# Patient Record
Sex: FEMALE | Race: WHITE | NOT HISPANIC OR LATINO | Employment: FULL TIME | URBAN - METROPOLITAN AREA
[De-identification: names, ages, dates, MRNs, and addresses within clinical notes are randomized per-mention and may not be internally consistent; named-entity substitution may affect disease eponyms.]

---

## 2017-02-09 ENCOUNTER — HOSPITAL ENCOUNTER (OUTPATIENT)
Dept: MAMMOGRAPHY | Facility: HOSPITAL | Age: 52
Discharge: HOME | End: 2017-02-09
Attending: OBSTETRICS & GYNECOLOGY

## 2017-02-09 DIAGNOSIS — Z12.31 OTHER SCREENING MAMMOGRAM: ICD-10-CM

## 2018-03-12 ENCOUNTER — HOSPITAL ENCOUNTER (OUTPATIENT)
Dept: MAMMOGRAPHY | Facility: CLINIC | Age: 53
Discharge: HOME | End: 2018-03-12
Attending: OBSTETRICS & GYNECOLOGY

## 2018-03-12 DIAGNOSIS — Z12.31 VISIT FOR SCREENING MAMMOGRAM: ICD-10-CM

## 2019-04-09 ENCOUNTER — HOSPITAL ENCOUNTER (OUTPATIENT)
Dept: MAMMOGRAPHY | Facility: CLINIC | Age: 54
Discharge: HOME | End: 2019-04-09
Attending: OBSTETRICS & GYNECOLOGY

## 2019-04-09 ENCOUNTER — COMMUNICATION - HEALTHEAST (OUTPATIENT)
Dept: TELEHEALTH | Facility: CLINIC | Age: 54
End: 2019-04-09

## 2019-04-09 DIAGNOSIS — Z12.31 VISIT FOR SCREENING MAMMOGRAM: ICD-10-CM

## 2020-06-10 ENCOUNTER — HOSPITAL ENCOUNTER (OUTPATIENT)
Dept: MAMMOGRAPHY | Facility: CLINIC | Age: 55
Discharge: HOME | End: 2020-06-10
Attending: OBSTETRICS & GYNECOLOGY

## 2020-06-10 ENCOUNTER — COMMUNICATION - HEALTHEAST (OUTPATIENT)
Dept: TELEHEALTH | Facility: CLINIC | Age: 55
End: 2020-06-10

## 2020-06-10 DIAGNOSIS — Z12.31 SCREENING MAMMOGRAM, ENCOUNTER FOR: ICD-10-CM

## 2021-05-28 ENCOUNTER — RECORDS - HEALTHEAST (OUTPATIENT)
Dept: ADMINISTRATIVE | Facility: CLINIC | Age: 56
End: 2021-05-28

## 2021-06-26 ENCOUNTER — HEALTH MAINTENANCE LETTER (OUTPATIENT)
Age: 56
End: 2021-06-26

## 2021-07-13 ENCOUNTER — RECORDS - HEALTHEAST (OUTPATIENT)
Dept: ADMINISTRATIVE | Facility: CLINIC | Age: 56
End: 2021-07-13

## 2021-07-21 ENCOUNTER — RECORDS - HEALTHEAST (OUTPATIENT)
Dept: ADMINISTRATIVE | Facility: CLINIC | Age: 56
End: 2021-07-21

## 2021-10-05 ENCOUNTER — PREP FOR PROCEDURE (OUTPATIENT)
Dept: OTHER | Facility: CLINIC | Age: 56
End: 2021-10-05

## 2021-10-05 ENCOUNTER — HOSPITAL ENCOUNTER (INPATIENT)
Facility: CLINIC | Age: 56
Setting detail: SURGERY ADMIT
End: 2021-10-05
Attending: ORTHOPAEDIC SURGERY | Admitting: ORTHOPAEDIC SURGERY
Payer: COMMERCIAL

## 2021-10-05 ENCOUNTER — HOSPITAL ENCOUNTER (INPATIENT)
Facility: CLINIC | Age: 56
LOS: 5 days | Discharge: HOME HEALTH | DRG: 522 | End: 2021-10-10
Attending: EMERGENCY MEDICINE | Admitting: INTERNAL MEDICINE
Payer: COMMERCIAL

## 2021-10-05 ENCOUNTER — APPOINTMENT (OUTPATIENT)
Dept: RADIOLOGY | Facility: CLINIC | Age: 56
DRG: 522 | End: 2021-10-05
Attending: EMERGENCY MEDICINE
Payer: COMMERCIAL

## 2021-10-05 DIAGNOSIS — S72.001A HIP FRACTURE, RIGHT, CLOSED, INITIAL ENCOUNTER (H): ICD-10-CM

## 2021-10-05 DIAGNOSIS — S72.001A: Primary | ICD-10-CM

## 2021-10-05 LAB
ANION GAP SERPL CALCULATED.3IONS-SCNC: 9 MMOL/L (ref 5–18)
BASOPHILS # BLD AUTO: 0 10E3/UL (ref 0–0.2)
BASOPHILS NFR BLD AUTO: 0 %
BUN SERPL-MCNC: 18 MG/DL (ref 8–22)
CALCIUM SERPL-MCNC: 8.8 MG/DL (ref 8.5–10.5)
CHLORIDE BLD-SCNC: 108 MMOL/L (ref 98–107)
CO2 SERPL-SCNC: 22 MMOL/L (ref 22–31)
CREAT SERPL-MCNC: 0.76 MG/DL (ref 0.6–1.1)
EOSINOPHIL # BLD AUTO: 0 10E3/UL (ref 0–0.7)
EOSINOPHIL NFR BLD AUTO: 0 %
ERYTHROCYTE [DISTWIDTH] IN BLOOD BY AUTOMATED COUNT: 11.7 % (ref 10–15)
GFR SERPL CREATININE-BSD FRML MDRD: 87 ML/MIN/1.73M2
GLUCOSE BLD-MCNC: 134 MG/DL (ref 70–125)
HCT VFR BLD AUTO: 35.5 % (ref 35–47)
HGB BLD-MCNC: 12 G/DL (ref 11.7–15.7)
IMM GRANULOCYTES # BLD: 0 10E3/UL
IMM GRANULOCYTES NFR BLD: 0 %
INR PPP: 1.04 (ref 0.85–1.15)
LYMPHOCYTES # BLD AUTO: 1.2 10E3/UL (ref 0.8–5.3)
LYMPHOCYTES NFR BLD AUTO: 10 %
MCH RBC QN AUTO: 30.2 PG (ref 26.5–33)
MCHC RBC AUTO-ENTMCNC: 33.8 G/DL (ref 31.5–36.5)
MCV RBC AUTO: 89 FL (ref 78–100)
MONOCYTES # BLD AUTO: 0.6 10E3/UL (ref 0–1.3)
MONOCYTES NFR BLD AUTO: 5 %
NEUTROPHILS # BLD AUTO: 10.7 10E3/UL (ref 1.6–8.3)
NEUTROPHILS NFR BLD AUTO: 85 %
NRBC # BLD AUTO: 0 10E3/UL
NRBC BLD AUTO-RTO: 0 /100
PLATELET # BLD AUTO: 165 10E3/UL (ref 150–450)
POTASSIUM BLD-SCNC: 3.9 MMOL/L (ref 3.5–5)
RBC # BLD AUTO: 3.98 10E6/UL (ref 3.8–5.2)
SODIUM SERPL-SCNC: 139 MMOL/L (ref 136–145)
WBC # BLD AUTO: 12.5 10E3/UL (ref 4–11)

## 2021-10-05 PROCEDURE — 96376 TX/PRO/DX INJ SAME DRUG ADON: CPT

## 2021-10-05 PROCEDURE — 80048 BASIC METABOLIC PNL TOTAL CA: CPT | Performed by: EMERGENCY MEDICINE

## 2021-10-05 PROCEDURE — 99223 1ST HOSP IP/OBS HIGH 75: CPT | Performed by: INTERNAL MEDICINE

## 2021-10-05 PROCEDURE — 96374 THER/PROPH/DIAG INJ IV PUSH: CPT | Mod: 59

## 2021-10-05 PROCEDURE — 27268 CLTX THIGH FX W/MNPJ: CPT

## 2021-10-05 PROCEDURE — C9803 HOPD COVID-19 SPEC COLLECT: HCPCS

## 2021-10-05 PROCEDURE — 999N000157 HC STATISTIC RCP TIME EA 10 MIN

## 2021-10-05 PROCEDURE — 99285 EMERGENCY DEPT VISIT HI MDM: CPT | Mod: 25

## 2021-10-05 PROCEDURE — 999N000065 XR HIP PORTABLE RIGHT 2-3 VIEWS

## 2021-10-05 PROCEDURE — 85041 AUTOMATED RBC COUNT: CPT | Performed by: EMERGENCY MEDICINE

## 2021-10-05 PROCEDURE — 71045 X-RAY EXAM CHEST 1 VIEW: CPT

## 2021-10-05 PROCEDURE — 93005 ELECTROCARDIOGRAM TRACING: CPT | Performed by: EMERGENCY MEDICINE

## 2021-10-05 PROCEDURE — 258N000003 HC RX IP 258 OP 636: Performed by: EMERGENCY MEDICINE

## 2021-10-05 PROCEDURE — 73502 X-RAY EXAM HIP UNI 2-3 VIEWS: CPT

## 2021-10-05 PROCEDURE — 250N000009 HC RX 250: Performed by: EMERGENCY MEDICINE

## 2021-10-05 PROCEDURE — 250N000011 HC RX IP 250 OP 636: Performed by: EMERGENCY MEDICINE

## 2021-10-05 PROCEDURE — 85610 PROTHROMBIN TIME: CPT | Performed by: EMERGENCY MEDICINE

## 2021-10-05 PROCEDURE — 96375 TX/PRO/DX INJ NEW DRUG ADDON: CPT

## 2021-10-05 PROCEDURE — 36415 COLL VENOUS BLD VENIPUNCTURE: CPT | Performed by: EMERGENCY MEDICINE

## 2021-10-05 PROCEDURE — 0SS9XZZ REPOSITION RIGHT HIP JOINT, EXTERNAL APPROACH: ICD-10-PCS | Performed by: EMERGENCY MEDICINE

## 2021-10-05 PROCEDURE — 96361 HYDRATE IV INFUSION ADD-ON: CPT

## 2021-10-05 PROCEDURE — U0005 INFEC AGEN DETEC AMPLI PROBE: HCPCS | Performed by: EMERGENCY MEDICINE

## 2021-10-05 PROCEDURE — 120N000001 HC R&B MED SURG/OB

## 2021-10-05 RX ORDER — FLUMAZENIL 0.1 MG/ML
0.2 INJECTION, SOLUTION INTRAVENOUS
Status: ACTIVE | OUTPATIENT
Start: 2021-10-05 | End: 2021-10-06

## 2021-10-05 RX ORDER — NALOXONE HYDROCHLORIDE 0.4 MG/ML
0.4 INJECTION, SOLUTION INTRAMUSCULAR; INTRAVENOUS; SUBCUTANEOUS
Status: DISCONTINUED | OUTPATIENT
Start: 2021-10-05 | End: 2021-10-10 | Stop reason: HOSPADM

## 2021-10-05 RX ORDER — NALOXONE HYDROCHLORIDE 0.4 MG/ML
0.2 INJECTION, SOLUTION INTRAMUSCULAR; INTRAVENOUS; SUBCUTANEOUS
Status: DISCONTINUED | OUTPATIENT
Start: 2021-10-05 | End: 2021-10-10 | Stop reason: HOSPADM

## 2021-10-05 RX ORDER — HYDROMORPHONE HYDROCHLORIDE 1 MG/ML
0.5 INJECTION, SOLUTION INTRAMUSCULAR; INTRAVENOUS; SUBCUTANEOUS ONCE
Status: COMPLETED | OUTPATIENT
Start: 2021-10-05 | End: 2021-10-05

## 2021-10-05 RX ORDER — LORAZEPAM 2 MG/ML
0.5 INJECTION INTRAMUSCULAR ONCE
Status: COMPLETED | OUTPATIENT
Start: 2021-10-05 | End: 2021-10-05

## 2021-10-05 RX ORDER — ONDANSETRON 2 MG/ML
4 INJECTION INTRAMUSCULAR; INTRAVENOUS ONCE
Status: COMPLETED | OUTPATIENT
Start: 2021-10-05 | End: 2021-10-05

## 2021-10-05 RX ORDER — MULTIVITAMIN,THERAPEUTIC
1 TABLET ORAL AT BEDTIME
COMMUNITY

## 2021-10-05 RX ORDER — SODIUM CHLORIDE 9 MG/ML
INJECTION, SOLUTION INTRAVENOUS CONTINUOUS
Status: DISCONTINUED | OUTPATIENT
Start: 2021-10-05 | End: 2021-10-06

## 2021-10-05 RX ORDER — PROPOFOL 10 MG/ML
1 INJECTION, EMULSION INTRAVENOUS ONCE
Status: COMPLETED | OUTPATIENT
Start: 2021-10-05 | End: 2021-10-05

## 2021-10-05 RX ADMIN — HYDROMORPHONE HYDROCHLORIDE 0.5 MG: 1 INJECTION, SOLUTION INTRAMUSCULAR; INTRAVENOUS; SUBCUTANEOUS at 23:31

## 2021-10-05 RX ADMIN — PROPOFOL 60 MG: 10 INJECTION, EMULSION INTRAVENOUS at 22:12

## 2021-10-05 RX ADMIN — FAMOTIDINE 20 MG: 10 INJECTION, SOLUTION INTRAVENOUS at 21:58

## 2021-10-05 RX ADMIN — SODIUM CHLORIDE 1000 ML: 9 INJECTION, SOLUTION INTRAVENOUS at 21:42

## 2021-10-05 RX ADMIN — ONDANSETRON 4 MG: 2 INJECTION INTRAMUSCULAR; INTRAVENOUS at 20:06

## 2021-10-05 RX ADMIN — HYDROMORPHONE HYDROCHLORIDE 1 MG: 1 INJECTION, SOLUTION INTRAMUSCULAR; INTRAVENOUS; SUBCUTANEOUS at 20:06

## 2021-10-05 RX ADMIN — LORAZEPAM 0.5 MG: 2 INJECTION INTRAMUSCULAR; INTRAVENOUS at 21:42

## 2021-10-05 RX ADMIN — SODIUM CHLORIDE: 9 INJECTION, SOLUTION INTRAVENOUS at 23:35

## 2021-10-05 RX ADMIN — ONDANSETRON 4 MG: 2 INJECTION INTRAMUSCULAR; INTRAVENOUS at 21:42

## 2021-10-05 RX ADMIN — HYDROMORPHONE HYDROCHLORIDE 1 MG: 1 INJECTION, SOLUTION INTRAMUSCULAR; INTRAVENOUS; SUBCUTANEOUS at 19:06

## 2021-10-05 ASSESSMENT — ENCOUNTER SYMPTOMS
ARTHRALGIAS: 1
MYALGIAS: 1
WOUND: 0

## 2021-10-05 NOTE — ED TRIAGE NOTES
Pt arrived via EMS. Was jogging and stepped into a hole with RLE and felt severe pain. Pt lying on side. EMS reports pt's RLE appeared shortened on scene. Was given 100mcg fentanyl PTA, reports pain is still severe.

## 2021-10-05 NOTE — ED PROVIDER NOTES
EMERGENCY DEPARTMENT ENCOUNTER      NAME: Viviana Smith  AGE: 56 year old female  YOB: 1965  MRN: [id number]  EVALUATION DATE & TIME: 10/5/2021  6:46 PM    PCP: Sia Delgadillo    ED PROVIDER: RIKI Greenwood    Chief Complaint   Patient presents with     Hip Pain     FINAL IMPRESSION:  1. Hip fracture, right, closed, initial encounter (H)      ED COURSE & MEDICAL DECISION MAKING:    Pertinent Labs & Imaging studies reviewed. (See chart for details)  56 year old female presents to the Emergency Department for evaluation of right hip pain.  Patient was jogging when she had sudden onset of severe right hip pain.  Inability ambulate.  Arrived via EMS.  Leg was shortened and rotated at arrival.  No direct trauma.  She is a very avid runner very athletic.  Exam concerning for hip fracture.  This was confirmed on x-ray with a fracture dislocation to the hip. Unusual mechanism.  Patient's pain was medicated.  I discussed case with Dr. Tellez who recommended attempted reduction.  We did procedural sedation with propofol please see procedure note below.  There was a very audible thud/crack with subsequent appearance to the leg more in line with reduction however postprocedural repeat x-ray did not demonstrate significant change.  The patient was feeling improved from a pain management standpoint.  I do not think further attempts at reduction will likely be successful at this time and patient will require operative intervention.  Contacted and updated Dr. Tellez.  Plan of care admission the hospital for pain control with plan for operative intervention in a.m.  Updated patient and family on test results and plan of care.    7:05 PM I met with the patient, obtained history, performed an initial exam, and discussed options and plan for diagnostics and treatment here in the ED. PPE worn including surgical mask, gloves.  9:07 PM I paged [facility].  9:12 PM I spoke with Dr. Tellez from [facility].  9:16 PM I rechecked and updated the patient.  9:19 PM I paged the hospitalist Dr. Abel  9:23 PM I spoke with Dr. Tellez from [facility].  9:24 PM I spoke with the hospitalist Dr. Abel.  10:09 PM I performed a reduction procedure of the patient's hip.    Diagnosis discussed with and explained to the patient and/or associated parties to their satisfaction.  All questions were answered at this time and they are in agreement with this diagnosis, treatment, and plan. No further emergent ED workup warranted at this time and patient did accept admission to the hospital.    MEDICATIONS GIVEN IN THE EMERGENCY:  New Prescriptions    No medications on file       NEW PRESCRIPTIONS STARTED AT TODAY'S ER VISIT  Patient's Medications   New Prescriptions    No medications on file   Previous Medications    MULTIVITAMIN, THERAPEUTIC (THERA-VIT) TABS TABLET    Take 1 tablet by mouth At Bedtime   Modified Medications    No medications on file   Discontinued Medications    No medications on file          =================================================================    HPI    Patient information was obtained from: the patient    Use of Intrepreter: N/A        Viviana Smith is a 56 year old female with no recorded pertinent medical history who presents by EMS for the evaluation of right buttock, hip pain. Patient reports she was jogging 1 hour prior to ED arrival (~6:00 PM) when she took a \"semi step\", \"jammed something\", then fell, but was unable to recall any further detail. She states she felt immediate, severe pain mostly to her right buttock, but also in her hip after she \"jammed something\". Patient describes her fall as \"slow\". Patient also believes she had a soft landing as she does not endorse any abrasions or lacerations to her right buttock. During the encounter, the patient was in a semi fetal position and states she has not moved from that position since her fall. Patient did not " hit her head during the fall and denies any other trauma or myalgias. Denies anticoagulant use. Denies a medical history of and is otherwise healthy.    Patient was given 100 mcg fentanyl by EMS en route to the ED, but reports the pain is still severe.      REVIEW OF SYSTEMS   Review of Systems   Musculoskeletal: Positive for arthralgias (right hip) and myalgias (right buttock).   Skin: Negative for rash and wound.   All other systems reviewed and are negative.    PAST MEDICAL HISTORY:  History reviewed. No pertinent past medical history.    PAST SURGICAL HISTORY:  History reviewed. No pertinent surgical history.    ALLERGIES:  No Known Allergies    FAMILY HISTORY:  Family History   Problem Relation Age of Onset     Breast Cancer No family hx of        SOCIAL HISTORY:   Social History     Socioeconomic History     Marital status:      Spouse name: Not on file     Number of children: Not on file     Years of education: Not on file     Highest education level: Not on file   Occupational History     Not on file   Tobacco Use     Smoking status: Not on file   Substance and Sexual Activity     Alcohol use: Not on file     Drug use: Not on file     Sexual activity: Not on file   Other Topics Concern     Not on file   Social History Narrative     Not on file     Social Determinants of Health     Financial Resource Strain:      Difficulty of Paying Living Expenses:    Food Insecurity:      Worried About Running Out of Food in the Last Year:      Ran Out of Food in the Last Year:    Transportation Needs:      Lack of Transportation (Medical):      Lack of Transportation (Non-Medical):    Physical Activity:      Days of Exercise per Week:      Minutes of Exercise per Session:    Stress:      Feeling of Stress :    Social Connections:      Frequency of Communication with Friends and Family:      Frequency of Social Gatherings with Friends and Family:      Attends Sikh Services:      Active Member of Clubs or  Organizations:      Attends Club or Organization Meetings:      Marital Status:    Intimate Partner Violence:      Fear of Current or Ex-Partner:      Emotionally Abused:      Physically Abused:      Sexually Abused:        VITALS:  Patient Vitals for the past 24 hrs:   BP Temp Temp src Pulse Resp SpO2 Weight   10/05/21 2200 120/84 -- -- 81 (!) 33 100 % --   10/05/21 2141 -- -- -- -- -- -- 56.7 kg (125 lb)   10/05/21 1850 119/71 98  F (36.7  C) Oral 63 16 100 % --       PHYSICAL EXAM   Constitutional:  Well developed, Well nourished, NAD  HENT:  Normocephalic, Atraumatic, Bilateral external ears normal, Oropharynx moist Nose normal.   Neck: Normal range of motion   Eyes: Conjunctiva normal, No discharge.   Respiratory:  Normal breath sounds, No respiratory distress, No wheezing.  No cough.  Cardiovascular:  Normal heart rate, Normal rhythm. No murmur appreciated.  Chest wall non-tender.  GI:  Soft, No tenderness, No masses, No flank tenderness.  No rebound or guarding.  : No CVA tenderness  Musculoskeletal:  Severe pain right hip with the leg shortened and rotated  Integument:  Warm, Dry, No erythema, No rash.    Neurologic:  Alert & oriented.  No focal deficits appreciated.    Nonambulatory  Psychiatric:  Affect normal, Judgment normal, Mood normal.     LAB:  All pertinent labs reviewed and interpreted.  Results for orders placed or performed during the hospital encounter of 10/05/21   XR Pelvis w Hip Right G/E 2 Views    Impression    IMPRESSION: Unusual fracture extending somewhat vertically through the femoral head with a crescent-shaped medial fracture fragment of the femoral head remaining articulating with the acetabulum. Remainder of the femur is proximally subluxed and lies   adjacent to the superolateral margin of the acetabulum. No other fracture identified.   XR Chest Port 1 View    Impression    IMPRESSION: Stomach is distended with air. Otherwise negative.   Result Value Ref Range    INR 1.04 0.85 -  1.15   Basic metabolic panel   Result Value Ref Range    Sodium 139 136 - 145 mmol/L    Potassium 3.9 3.5 - 5.0 mmol/L    Chloride 108 (H) 98 - 107 mmol/L    Carbon Dioxide (CO2) 22 22 - 31 mmol/L    Anion Gap 9 5 - 18 mmol/L    Urea Nitrogen 18 8 - 22 mg/dL    Creatinine 0.76 0.60 - 1.10 mg/dL    Calcium 8.8 8.5 - 10.5 mg/dL    Glucose 134 (H) 70 - 125 mg/dL    GFR Estimate 87 >60 mL/min/1.73m2   CBC with platelets and differential   Result Value Ref Range    WBC Count 12.5 (H) 4.0 - 11.0 10e3/uL    RBC Count 3.98 3.80 - 5.20 10e6/uL    Hemoglobin 12.0 11.7 - 15.7 g/dL    Hematocrit 35.5 35.0 - 47.0 %    MCV 89 78 - 100 fL    MCH 30.2 26.5 - 33.0 pg    MCHC 33.8 31.5 - 36.5 g/dL    RDW 11.7 10.0 - 15.0 %    Platelet Count 165 150 - 450 10e3/uL    % Neutrophils 85 %    % Lymphocytes 10 %    % Monocytes 5 %    % Eosinophils 0 %    % Basophils 0 %    % Immature Granulocytes 0 %    NRBCs per 100 WBC 0 <1 /100    Absolute Neutrophils 10.7 (H) 1.6 - 8.3 10e3/uL    Absolute Lymphocytes 1.2 0.8 - 5.3 10e3/uL    Absolute Monocytes 0.6 0.0 - 1.3 10e3/uL    Absolute Eosinophils 0.0 0.0 - 0.7 10e3/uL    Absolute Basophils 0.0 0.0 - 0.2 10e3/uL    Absolute Immature Granulocytes 0.0 <=0.0 10e3/uL    Absolute NRBCs 0.0 10e3/uL       RADIOLOGY:  Reviewed all pertinent imaging. Please see official radiology report.  XR Chest Port 1 View   Final Result   IMPRESSION: Stomach is distended with air. Otherwise negative.      XR Pelvis w Hip Right G/E 2 Views   Final Result   IMPRESSION: Unusual fracture extending somewhat vertically through the femoral head with a crescent-shaped medial fracture fragment of the femoral head remaining articulating with the acetabulum. Remainder of the femur is proximally subluxed and lies    adjacent to the superolateral margin of the acetabulum. No other fracture identified.          EKG:    Performed at: 2130  EKG Interpretation    Independently interpreted by me    Rhythm: normal sinus   Rate:  normal  Axis: normal  Ectopy: none  Conduction: normal  ST Segments: no acute change  T Waves: no acute change  Q Waves: none    Clinical Impression: no acute changes and normal EKG    I have independently reviewed and interpreted the EKG(s) documented above.    PROCEDURES:   [facility]    -Fracture    Date/Time: 10/5/2021 11:07 PM  Performed by: Akira Greenwood MD  Authorized by: Akira Greenwood MD     ED EVALUATION:      I have performed an Emergency Department Evaluation including taking a history and physical examination, this evaluation will be documented in the electronic medical record for this ED encounter.      ASA Class: Class 1- healthy patient    Mallampati: Grade 1- soft palate, uvula, tonsillar pillars, and posterior pharyngeal wall visible    NPO Status: yes  UNIVERSAL PROTOCOL   Site Marked: NA  Prior Images Obtained and Reviewed:  Yes  Required items: Required blood products, implants, devices and special equipment available    Patient identity confirmed:  Verbally with patient  Patient was reevaluated immediately before administering moderate or deep sedation or anesthesia  Confirmation Checklist:  Patient's identity using two indicators  Time out: Immediately prior to the procedure a time out was called    Universal Protocol: the Joint Commission Universal Protocol was followed    Preparation: Patient was prepped and draped in usual sterile fashion          INJURY      Injury location:  Hip    Hip injury location:  R hip    PRE PROCEDURE ASSESSMENT      Neurological function: normal      Distal perfusion: normal      Range of motion: reduced      SEDATION    Patient Sedated: Yes    Sedation Type:  Deep  Sedation:  Propofol  Vital signs: Vital signs monitored during sedation      ANESTHESIA (see MAR for exact dosages)      Anesthesia method:  None    PROCEDURE DETAILS:     Manipulation performed: yes      Skeletal traction used: yes      Reduction successful: yes       X-ray confirmed reduction: yes      POST PROCEDURE ASSESSMENT      Neurological function: normal      Distal perfusion: normal      Range of motion: unchanged      PROCEDURE   Patient Tolerance:  Patient tolerated the procedure well with no immediate complications  Describe Procedure: Utilizing a combination of traction and internal and external rotation there was a audible thud with subsequent reduction in the shortening of the leg that I felt was most consistent with reduction however on repeat x-ray no significant changes appreciated.  Patient's pain is improved post times of procedure.  Length of time physician/provider present for 1:1 monitoring during sedation: 0        I, Jaren Vazquez, am serving as a scribe to document services personally performed by Dr. Greenwood based on my observation and the provider's statements to me. I, Akira Greenwood MD attest that Jaren Vazquez is acting in a scribe capacity, has observed my performance of the services and has documented them in accordance with my direction.    Akira Greenwood M.D.  Emergency Medicine  [facility] EMERGENCY ROOM  [address] [place] [id number]  [phone]  Dept: [phone]     Akira Greenwood MD  10/05/21 0952

## 2021-10-06 ENCOUNTER — APPOINTMENT (OUTPATIENT)
Dept: RADIOLOGY | Facility: CLINIC | Age: 56
DRG: 522 | End: 2021-10-06
Attending: ORTHOPAEDIC SURGERY
Payer: COMMERCIAL

## 2021-10-06 ENCOUNTER — ANESTHESIA (OUTPATIENT)
Dept: SURGERY | Facility: CLINIC | Age: 56
DRG: 522 | End: 2021-10-06
Payer: COMMERCIAL

## 2021-10-06 ENCOUNTER — ANESTHESIA EVENT (OUTPATIENT)
Dept: SURGERY | Facility: CLINIC | Age: 56
DRG: 522 | End: 2021-10-06
Payer: COMMERCIAL

## 2021-10-06 ENCOUNTER — APPOINTMENT (OUTPATIENT)
Dept: CT IMAGING | Facility: CLINIC | Age: 56
DRG: 522 | End: 2021-10-06
Attending: ORTHOPAEDIC SURGERY
Payer: COMMERCIAL

## 2021-10-06 LAB
ALBUMIN SERPL-MCNC: 3.3 G/DL (ref 3.5–5)
ALP SERPL-CCNC: 56 U/L (ref 45–120)
ALT SERPL W P-5'-P-CCNC: 14 U/L (ref 0–45)
ANION GAP SERPL CALCULATED.3IONS-SCNC: 11 MMOL/L (ref 5–18)
AST SERPL W P-5'-P-CCNC: 19 U/L (ref 0–40)
ATRIAL RATE - MUSE: 71 BPM
BASOPHILS # BLD AUTO: 0 10E3/UL (ref 0–0.2)
BASOPHILS NFR BLD AUTO: 0 %
BILIRUB SERPL-MCNC: 0.7 MG/DL (ref 0–1)
BUN SERPL-MCNC: 11 MG/DL (ref 8–22)
CALCIUM SERPL-MCNC: 8.4 MG/DL (ref 8.5–10.5)
CHLORIDE BLD-SCNC: 110 MMOL/L (ref 98–107)
CO2 SERPL-SCNC: 20 MMOL/L (ref 22–31)
CREAT SERPL-MCNC: 0.77 MG/DL (ref 0.6–1.1)
DIASTOLIC BLOOD PRESSURE - MUSE: 81 MMHG
EOSINOPHIL # BLD AUTO: 0 10E3/UL (ref 0–0.7)
EOSINOPHIL NFR BLD AUTO: 0 %
ERYTHROCYTE [DISTWIDTH] IN BLOOD BY AUTOMATED COUNT: 11.9 % (ref 10–15)
GFR SERPL CREATININE-BSD FRML MDRD: 85 ML/MIN/1.73M2
GLUCOSE BLD-MCNC: 125 MG/DL (ref 70–125)
HCT VFR BLD AUTO: 33.7 % (ref 35–47)
HGB BLD-MCNC: 11.2 G/DL (ref 11.7–15.7)
IMM GRANULOCYTES # BLD: 0 10E3/UL
IMM GRANULOCYTES NFR BLD: 0 %
INTERPRETATION ECG - MUSE: NORMAL
LYMPHOCYTES # BLD AUTO: 0.6 10E3/UL (ref 0.8–5.3)
LYMPHOCYTES NFR BLD AUTO: 6 %
MCH RBC QN AUTO: 30.8 PG (ref 26.5–33)
MCHC RBC AUTO-ENTMCNC: 33.2 G/DL (ref 31.5–36.5)
MCV RBC AUTO: 93 FL (ref 78–100)
MONOCYTES # BLD AUTO: 0.5 10E3/UL (ref 0–1.3)
MONOCYTES NFR BLD AUTO: 4 %
NEUTROPHILS # BLD AUTO: 9.6 10E3/UL (ref 1.6–8.3)
NEUTROPHILS NFR BLD AUTO: 90 %
NRBC # BLD AUTO: 0 10E3/UL
NRBC BLD AUTO-RTO: 0 /100
P AXIS - MUSE: 69 DEGREES
PLATELET # BLD AUTO: 148 10E3/UL (ref 150–450)
POTASSIUM BLD-SCNC: 4.2 MMOL/L (ref 3.5–5)
PR INTERVAL - MUSE: 138 MS
PROT SERPL-MCNC: 6.1 G/DL (ref 6–8)
QRS DURATION - MUSE: 80 MS
QT - MUSE: 402 MS
QTC - MUSE: 436 MS
R AXIS - MUSE: 28 DEGREES
RBC # BLD AUTO: 3.64 10E6/UL (ref 3.8–5.2)
SARS-COV-2 RNA RESP QL NAA+PROBE: NEGATIVE
SODIUM SERPL-SCNC: 141 MMOL/L (ref 136–145)
SYSTOLIC BLOOD PRESSURE - MUSE: 114 MMHG
T AXIS - MUSE: 28 DEGREES
VENTRICULAR RATE- MUSE: 71 BPM
WBC # BLD AUTO: 10.7 10E3/UL (ref 4–11)

## 2021-10-06 PROCEDURE — 250N000011 HC RX IP 250 OP 636: Performed by: PHYSICIAN ASSISTANT

## 2021-10-06 PROCEDURE — 85025 COMPLETE CBC W/AUTO DIFF WBC: CPT | Performed by: INTERNAL MEDICINE

## 2021-10-06 PROCEDURE — 999N000182 XR SURGERY CARM FLUORO GREATER THAN 5 MIN

## 2021-10-06 PROCEDURE — 80053 COMPREHEN METABOLIC PANEL: CPT | Performed by: INTERNAL MEDICINE

## 2021-10-06 PROCEDURE — 250N000011 HC RX IP 250 OP 636: Performed by: ANESTHESIOLOGY

## 2021-10-06 PROCEDURE — 73700 CT LOWER EXTREMITY W/O DYE: CPT | Mod: RT

## 2021-10-06 PROCEDURE — 0SS9XZZ REPOSITION RIGHT HIP JOINT, EXTERNAL APPROACH: ICD-10-PCS | Performed by: ORTHOPAEDIC SURGERY

## 2021-10-06 PROCEDURE — 258N000003 HC RX IP 258 OP 636: Performed by: NURSE ANESTHETIST, CERTIFIED REGISTERED

## 2021-10-06 PROCEDURE — 370N000017 HC ANESTHESIA TECHNICAL FEE, PER MIN: Performed by: ORTHOPAEDIC SURGERY

## 2021-10-06 PROCEDURE — 120N000001 HC R&B MED SURG/OB

## 2021-10-06 PROCEDURE — 360N000081 HC SURGERY LEVEL 1 W/ FLUORO, PER MIN: Performed by: ORTHOPAEDIC SURGERY

## 2021-10-06 PROCEDURE — 250N000013 HC RX MED GY IP 250 OP 250 PS 637: Performed by: PHYSICIAN ASSISTANT

## 2021-10-06 PROCEDURE — 258N000003 HC RX IP 258 OP 636: Performed by: INTERNAL MEDICINE

## 2021-10-06 PROCEDURE — 250N000011 HC RX IP 250 OP 636: Performed by: NURSE ANESTHETIST, CERTIFIED REGISTERED

## 2021-10-06 PROCEDURE — 99232 SBSQ HOSP IP/OBS MODERATE 35: CPT | Performed by: INTERNAL MEDICINE

## 2021-10-06 PROCEDURE — 36415 COLL VENOUS BLD VENIPUNCTURE: CPT | Performed by: INTERNAL MEDICINE

## 2021-10-06 PROCEDURE — 710N000010 HC RECOVERY PHASE 1, LEVEL 2, PER MIN: Performed by: ORTHOPAEDIC SURGERY

## 2021-10-06 RX ORDER — POLYETHYLENE GLYCOL 3350 17 G/17G
17 POWDER, FOR SOLUTION ORAL DAILY
Status: DISCONTINUED | OUTPATIENT
Start: 2021-10-07 | End: 2021-10-10 | Stop reason: HOSPADM

## 2021-10-06 RX ORDER — SODIUM CHLORIDE, SODIUM LACTATE, POTASSIUM CHLORIDE, CALCIUM CHLORIDE 600; 310; 30; 20 MG/100ML; MG/100ML; MG/100ML; MG/100ML
INJECTION, SOLUTION INTRAVENOUS CONTINUOUS PRN
Status: DISCONTINUED | OUTPATIENT
Start: 2021-10-06 | End: 2021-10-06

## 2021-10-06 RX ORDER — PROCHLORPERAZINE MALEATE 10 MG
10 TABLET ORAL EVERY 6 HOURS PRN
Status: DISCONTINUED | OUTPATIENT
Start: 2021-10-06 | End: 2021-10-10 | Stop reason: HOSPADM

## 2021-10-06 RX ORDER — HYDROMORPHONE HCL IN WATER/PF 6 MG/30 ML
0.4 PATIENT CONTROLLED ANALGESIA SYRINGE INTRAVENOUS
Status: DISCONTINUED | OUTPATIENT
Start: 2021-10-06 | End: 2021-10-10 | Stop reason: HOSPADM

## 2021-10-06 RX ORDER — ONDANSETRON 4 MG/1
4 TABLET, ORALLY DISINTEGRATING ORAL EVERY 6 HOURS PRN
Status: DISCONTINUED | OUTPATIENT
Start: 2021-10-06 | End: 2021-10-06

## 2021-10-06 RX ORDER — ASPIRIN 81 MG/1
81 TABLET ORAL 2 TIMES DAILY
Status: DISCONTINUED | OUTPATIENT
Start: 2021-10-06 | End: 2021-10-10 | Stop reason: HOSPADM

## 2021-10-06 RX ORDER — HYDROMORPHONE HCL IN WATER/PF 6 MG/30 ML
0.2 PATIENT CONTROLLED ANALGESIA SYRINGE INTRAVENOUS EVERY 5 MIN PRN
Status: DISCONTINUED | OUTPATIENT
Start: 2021-10-06 | End: 2021-10-06 | Stop reason: HOSPADM

## 2021-10-06 RX ORDER — CEFAZOLIN SODIUM 1 G/3ML
1 INJECTION, POWDER, FOR SOLUTION INTRAMUSCULAR; INTRAVENOUS EVERY 8 HOURS
Status: COMPLETED | OUTPATIENT
Start: 2021-10-06 | End: 2021-10-06

## 2021-10-06 RX ORDER — PROPOFOL 10 MG/ML
INJECTION, EMULSION INTRAVENOUS PRN
Status: DISCONTINUED | OUTPATIENT
Start: 2021-10-06 | End: 2021-10-06

## 2021-10-06 RX ORDER — ONDANSETRON 2 MG/ML
4 INJECTION INTRAMUSCULAR; INTRAVENOUS EVERY 6 HOURS PRN
Status: DISCONTINUED | OUTPATIENT
Start: 2021-10-06 | End: 2021-10-06

## 2021-10-06 RX ORDER — PROCHLORPERAZINE 25 MG
25 SUPPOSITORY, RECTAL RECTAL EVERY 12 HOURS PRN
Status: DISCONTINUED | OUTPATIENT
Start: 2021-10-06 | End: 2021-10-10 | Stop reason: HOSPADM

## 2021-10-06 RX ORDER — SODIUM CHLORIDE 9 MG/ML
INJECTION, SOLUTION INTRAVENOUS CONTINUOUS
Status: DISCONTINUED | OUTPATIENT
Start: 2021-10-06 | End: 2021-10-08

## 2021-10-06 RX ORDER — SODIUM CHLORIDE, SODIUM LACTATE, POTASSIUM CHLORIDE, CALCIUM CHLORIDE 600; 310; 30; 20 MG/100ML; MG/100ML; MG/100ML; MG/100ML
INJECTION, SOLUTION INTRAVENOUS CONTINUOUS
Status: DISCONTINUED | OUTPATIENT
Start: 2021-10-06 | End: 2021-10-06 | Stop reason: HOSPADM

## 2021-10-06 RX ORDER — ONDANSETRON 4 MG/1
4 TABLET, ORALLY DISINTEGRATING ORAL EVERY 6 HOURS PRN
Status: DISCONTINUED | OUTPATIENT
Start: 2021-10-06 | End: 2021-10-10 | Stop reason: HOSPADM

## 2021-10-06 RX ORDER — CYCLOBENZAPRINE HCL 10 MG
10 TABLET ORAL EVERY 8 HOURS PRN
Status: DISCONTINUED | OUTPATIENT
Start: 2021-10-06 | End: 2021-10-10 | Stop reason: HOSPADM

## 2021-10-06 RX ORDER — ACETAMINOPHEN 325 MG/1
975 TABLET ORAL EVERY 8 HOURS
Status: COMPLETED | OUTPATIENT
Start: 2021-10-06 | End: 2021-10-08

## 2021-10-06 RX ORDER — AMOXICILLIN 250 MG
1 CAPSULE ORAL 2 TIMES DAILY
Status: DISCONTINUED | OUTPATIENT
Start: 2021-10-06 | End: 2021-10-10 | Stop reason: HOSPADM

## 2021-10-06 RX ORDER — LIDOCAINE 40 MG/G
CREAM TOPICAL
Status: DISCONTINUED | OUTPATIENT
Start: 2021-10-06 | End: 2021-10-08

## 2021-10-06 RX ORDER — LIDOCAINE 40 MG/G
CREAM TOPICAL
Status: DISCONTINUED | OUTPATIENT
Start: 2021-10-06 | End: 2021-10-06 | Stop reason: HOSPADM

## 2021-10-06 RX ORDER — BISACODYL 10 MG
10 SUPPOSITORY, RECTAL RECTAL DAILY PRN
Status: DISCONTINUED | OUTPATIENT
Start: 2021-10-06 | End: 2021-10-10 | Stop reason: HOSPADM

## 2021-10-06 RX ORDER — ACETAMINOPHEN 325 MG/1
650 TABLET ORAL EVERY 4 HOURS PRN
Status: DISCONTINUED | OUTPATIENT
Start: 2021-10-09 | End: 2021-10-10 | Stop reason: HOSPADM

## 2021-10-06 RX ORDER — OXYCODONE HYDROCHLORIDE 5 MG/1
10 TABLET ORAL EVERY 4 HOURS PRN
Status: DISCONTINUED | OUTPATIENT
Start: 2021-10-06 | End: 2021-10-10 | Stop reason: HOSPADM

## 2021-10-06 RX ORDER — SODIUM CHLORIDE, SODIUM LACTATE, POTASSIUM CHLORIDE, CALCIUM CHLORIDE 600; 310; 30; 20 MG/100ML; MG/100ML; MG/100ML; MG/100ML
INJECTION, SOLUTION INTRAVENOUS CONTINUOUS
Status: DISCONTINUED | OUTPATIENT
Start: 2021-10-06 | End: 2021-10-06

## 2021-10-06 RX ORDER — FENTANYL CITRATE 50 UG/ML
25 INJECTION, SOLUTION INTRAMUSCULAR; INTRAVENOUS EVERY 5 MIN PRN
Status: DISCONTINUED | OUTPATIENT
Start: 2021-10-06 | End: 2021-10-06 | Stop reason: HOSPADM

## 2021-10-06 RX ORDER — PROCHLORPERAZINE MALEATE 10 MG
10 TABLET ORAL EVERY 6 HOURS PRN
Status: DISCONTINUED | OUTPATIENT
Start: 2021-10-06 | End: 2021-10-06

## 2021-10-06 RX ORDER — OXYCODONE HYDROCHLORIDE 5 MG/1
5 TABLET ORAL EVERY 4 HOURS PRN
Status: DISCONTINUED | OUTPATIENT
Start: 2021-10-06 | End: 2021-10-06 | Stop reason: HOSPADM

## 2021-10-06 RX ORDER — ONDANSETRON 2 MG/ML
4 INJECTION INTRAMUSCULAR; INTRAVENOUS EVERY 6 HOURS PRN
Status: DISCONTINUED | OUTPATIENT
Start: 2021-10-06 | End: 2021-10-10 | Stop reason: HOSPADM

## 2021-10-06 RX ORDER — HYDROMORPHONE HCL IN WATER/PF 6 MG/30 ML
0.2 PATIENT CONTROLLED ANALGESIA SYRINGE INTRAVENOUS
Status: DISCONTINUED | OUTPATIENT
Start: 2021-10-06 | End: 2021-10-10 | Stop reason: HOSPADM

## 2021-10-06 RX ORDER — OXYCODONE HYDROCHLORIDE 5 MG/1
5 TABLET ORAL EVERY 4 HOURS PRN
Status: DISCONTINUED | OUTPATIENT
Start: 2021-10-06 | End: 2021-10-10 | Stop reason: HOSPADM

## 2021-10-06 RX ORDER — DEXAMETHASONE SODIUM PHOSPHATE 10 MG/ML
INJECTION, SOLUTION INTRAMUSCULAR; INTRAVENOUS PRN
Status: DISCONTINUED | OUTPATIENT
Start: 2021-10-06 | End: 2021-10-06

## 2021-10-06 RX ORDER — ONDANSETRON 2 MG/ML
4 INJECTION INTRAMUSCULAR; INTRAVENOUS EVERY 30 MIN PRN
Status: DISCONTINUED | OUTPATIENT
Start: 2021-10-06 | End: 2021-10-06 | Stop reason: HOSPADM

## 2021-10-06 RX ORDER — ONDANSETRON 4 MG/1
4 TABLET, ORALLY DISINTEGRATING ORAL EVERY 30 MIN PRN
Status: DISCONTINUED | OUTPATIENT
Start: 2021-10-06 | End: 2021-10-06 | Stop reason: HOSPADM

## 2021-10-06 RX ORDER — LIDOCAINE 40 MG/G
CREAM TOPICAL
Status: DISCONTINUED | OUTPATIENT
Start: 2021-10-06 | End: 2021-10-06

## 2021-10-06 RX ORDER — ONDANSETRON 2 MG/ML
INJECTION INTRAMUSCULAR; INTRAVENOUS PRN
Status: DISCONTINUED | OUTPATIENT
Start: 2021-10-06 | End: 2021-10-06

## 2021-10-06 RX ADMIN — ASPIRIN 81 MG: 81 TABLET, COATED ORAL at 10:40

## 2021-10-06 RX ADMIN — SODIUM CHLORIDE: 9 INJECTION, SOLUTION INTRAVENOUS at 13:03

## 2021-10-06 RX ADMIN — CEFAZOLIN 1 G: 1 INJECTION, POWDER, FOR SOLUTION INTRAMUSCULAR; INTRAVENOUS at 11:08

## 2021-10-06 RX ADMIN — DEXAMETHASONE SODIUM PHOSPHATE 10 MG: 10 INJECTION, SOLUTION INTRAMUSCULAR; INTRAVENOUS at 00:35

## 2021-10-06 RX ADMIN — ACETAMINOPHEN 975 MG: 325 TABLET ORAL at 10:40

## 2021-10-06 RX ADMIN — ONDANSETRON 4 MG: 2 INJECTION INTRAMUSCULAR; INTRAVENOUS at 00:35

## 2021-10-06 RX ADMIN — CYCLOBENZAPRINE HYDROCHLORIDE 10 MG: 10 TABLET, FILM COATED ORAL at 22:05

## 2021-10-06 RX ADMIN — CEFAZOLIN 1 G: 1 INJECTION, POWDER, FOR SOLUTION INTRAMUSCULAR; INTRAVENOUS at 03:11

## 2021-10-06 RX ADMIN — FENTANYL CITRATE 25 MCG: 0.05 INJECTION, SOLUTION INTRAMUSCULAR; INTRAVENOUS at 01:18

## 2021-10-06 RX ADMIN — ASPIRIN 81 MG: 81 TABLET, COATED ORAL at 21:46

## 2021-10-06 RX ADMIN — ACETAMINOPHEN 975 MG: 325 TABLET ORAL at 18:38

## 2021-10-06 RX ADMIN — ACETAMINOPHEN 975 MG: 325 TABLET ORAL at 03:05

## 2021-10-06 RX ADMIN — PROPOFOL 50 MG: 10 INJECTION, EMULSION INTRAVENOUS at 00:37

## 2021-10-06 RX ADMIN — MIDAZOLAM 2 MG: 1 INJECTION INTRAMUSCULAR; INTRAVENOUS at 00:23

## 2021-10-06 RX ADMIN — ONDANSETRON 4 MG: 2 INJECTION INTRAMUSCULAR; INTRAVENOUS at 01:14

## 2021-10-06 RX ADMIN — PROPOFOL 100 MG: 10 INJECTION, EMULSION INTRAVENOUS at 00:30

## 2021-10-06 RX ADMIN — FENTANYL CITRATE 25 MCG: 0.05 INJECTION, SOLUTION INTRAMUSCULAR; INTRAVENOUS at 01:27

## 2021-10-06 RX ADMIN — SODIUM CHLORIDE: 9 INJECTION, SOLUTION INTRAVENOUS at 02:22

## 2021-10-06 RX ADMIN — Medication 60 MG: at 00:31

## 2021-10-06 RX ADMIN — SODIUM CHLORIDE, POTASSIUM CHLORIDE, SODIUM LACTATE AND CALCIUM CHLORIDE: 600; 310; 30; 20 INJECTION, SOLUTION INTRAVENOUS at 00:43

## 2021-10-06 ASSESSMENT — MIFFLIN-ST. JEOR: SCORE: 1242.68

## 2021-10-06 NOTE — PLAN OF CARE
"Pt complains of moderate to severe pain with movement during shift. Refused any PRN medications, both RN and PA discussed use of additional medications to control pain. Pt expressed she is open to using more medications following surgery stating, \"I am okay if I do not move\". Pt complains of mild tingling in bilateral lower extremities most likely due to lying in bed. Pt was tearful on an off during shift, expressing frustration regarding the situation.  "

## 2021-10-06 NOTE — PROGRESS NOTES
"Orthopedic Progress Note      Assessment: Day of Surgery  S/P Procedure(s):  CLOSED REDUCTION, HIP RIGHT     Plan:   - Awaiting updates for when surgery will be, possibly tomorrow. Discontinued NPO and she may eat/drink today, will start NPO again at midnight in case surgery happens tomorrow 10/7.   - Continue pain management and positional comfort. Only wanting tylenol at this time, but may change her mind. She may be open to a muscle relaxer while waiting for surgery, order for flexeril was added prn.   - May start rose catheter    Subjective:  Patient reports her pain level is tolerable at rest if she does not move, but increases with any movement. She explains pain as a deep throbbing in the hip region. She is refusing any pain medication other than Tylenol at this time, stating she doesn't like the effects on the body, including constipation. She explains that she knows she will more likely need pain meds after surgery so would like to avoid them as long as possible. Denies numbness or tingling in the leg. She has questions about the surgery, etc, that I was unable to answer at this time as I await more information from surgeons.     Objective:  /65 (BP Location: Left arm)   Pulse 75   Temp 97.8  F (36.6  C) (Oral)   Resp 16   Ht 1.6 m (5' 3\")   Wt 69.4 kg (152 lb 14.4 oz)   SpO2 96%   BMI 27.09 kg/m    The patient is A&Ox3. In mild distress, tearful and upset.   Sensation is intact.  Dorsiflexion and plantar flexion is intact.  Dorsalis pedis pulse intact.  Calves are soft and non-tender. Negative Radu's.  No ROM tested.       Pertinent Labs   Lab Results: personally reviewed.   Lab Results   Component Value Date    INR 1.04 10/05/2021     Lab Results   Component Value Date    WBC 10.7 10/06/2021    HGB 11.2 (L) 10/06/2021    HCT 33.7 (L) 10/06/2021    MCV 93 10/06/2021     (L) 10/06/2021     Lab Results   Component Value Date     10/06/2021    CO2 20 (L) 10/06/2021         Report " completed by:  Zara Aiken PA-C, ALY  Date: 10/6/2021  Time: 1:55 PM

## 2021-10-06 NOTE — ANESTHESIA POSTPROCEDURE EVALUATION
Patient: Viviana Bowen    Procedure: Procedure(s):  CLOSED REDUCTION, HIP RIGHT       Diagnosis:Hip dislocation, right (H) [S73.004A]  Diagnosis Additional Information: No value filed.    Anesthesia Type:  General    Note:  Disposition: Admission   Postop Pain Control: Uneventful            Sign Out: Well controlled pain   PONV: No   Neuro/Psych: Uneventful            Sign Out: Acceptable/Baseline neuro status   Airway/Respiratory: Uneventful            Sign Out: Acceptable/Baseline resp. status   CV/Hemodynamics: Uneventful            Sign Out: Acceptable CV status; No obvious hypovolemia; No obvious fluid overload   Other NRE: NONE   DID A NON-ROUTINE EVENT OCCUR? No           Last vitals:  Vitals Value Taken Time   BP 98/61 10/06/21 0130   Temp 36.2  C (97.2  F) 10/06/21 0053   Pulse 88 10/06/21 0139   Resp 17 10/06/21 0139   SpO2 97 % 10/06/21 0139   Vitals shown include unvalidated device data.    Electronically Signed By: DARIUSZ ORTIZ MD  October 6, 2021  1:40 AM

## 2021-10-06 NOTE — ANESTHESIA PREPROCEDURE EVALUATION
Anesthesia Pre-Procedure Evaluation    Patient: Viviana Bowen   MRN: [id number] : 1965        Preoperative Diagnosis: Hip dislocation, right (H) [S73.004A]    Procedure : Procedure(s):  CLOSED REDUCTION, HIP          History reviewed. No pertinent past medical history.   History reviewed. No pertinent surgical history.   No Known Allergies   Social History     Tobacco Use     Smoking status: Not on file   Substance Use Topics     Alcohol use: Not on file      Wt Readings from Last 1 Encounters:   10/05/21 56.7 kg (125 lb)        Anesthesia Evaluation            ROS/MED HX  ENT/Pulmonary:  - neg pulmonary ROS     Neurologic:  - neg neurologic ROS     Cardiovascular:  - neg cardiovascular ROS     METS/Exercise Tolerance:     Hematologic:  - neg hematologic  ROS     Musculoskeletal: Comment: Dislocated hip from fall      GI/Hepatic:  - neg GI/hepatic ROS     Renal/Genitourinary:  - neg Renal ROS     Endo:  - neg endo ROS     Psychiatric/Substance Use:       Infectious Disease:       Malignancy:       Other:            Physical Exam    Airway  airway exam normal           Respiratory Devices and Support         Dental  no notable dental history         Cardiovascular   cardiovascular exam normal          Pulmonary   pulmonary exam normal                OUTSIDE LABS:  CBC:   Lab Results   Component Value Date    WBC 12.5 (H) 10/05/2021    HGB 12.0 10/05/2021    HCT 35.5 10/05/2021     10/05/2021     BMP:   Lab Results   Component Value Date     10/05/2021    POTASSIUM 3.9 10/05/2021    CHLORIDE 108 (H) 10/05/2021    CO2 22 10/05/2021    BUN 18 10/05/2021    CR 0.76 10/05/2021     (H) 10/05/2021     COAGS:   Lab Results   Component Value Date    INR 1.04 10/05/2021     POC: No results found for: BGM, HCG, HCGS  HEPATIC: No results found for: ALBUMIN, PROTTOTAL, ALT, AST, GGT, ALKPHOS, BILITOTAL, BILIDIRECT, KASHIF  OTHER:   Lab Results   Component Value Date    SATNAM 8.8 10/05/2021        Anesthesia Plan    ASA Status:  2, emergent       Anesthesia Type: General.     - Airway: Mask Only              Consents    Anesthesia Plan(s) and associated risks, benefits, and realistic alternatives discussed. Questions answered and patient/representative(s) expressed understanding.     - Discussed with:  Patient         Postoperative Care            Comments:                DARIUSZ ORTIZ MD

## 2021-10-06 NOTE — ANESTHESIA CARE TRANSFER NOTE
Patient: Viviana Bowen    Procedure: Procedure(s):  CLOSED REDUCTION, HIP RIGHT       Diagnosis: Hip dislocation, right (H) [S73.004A]  Diagnosis Additional Information: No value filed.    Anesthesia Type:   General     Note:    Oropharynx: oropharynx clear of all foreign objects  Level of Consciousness: drowsy  Oxygen Supplementation: nasal cannula  Level of Supplemental Oxygen (L/min / FiO2): 3  Independent Airway: airway patency satisfactory and stable  Dentition: dentition unchanged  Vital Signs Stable: post-procedure vital signs reviewed and stable  Report to RN Given: handoff report given  Patient transferred to: PACU    Handoff Report: Identifed the Patient, Identified the Reponsible Provider, Reviewed the pertinent medical history, Discussed the surgical course, Reviewed Intra-OP anesthesia mangement and issues during anesthesia, Set expectations for post-procedure period and Allowed opportunity for questions and acknowledgement of understanding      Vitals:  Vitals Value Taken Time   /65 10/06/21 0054   Temp 36.2  C (97.2  F) 10/06/21 0053   Pulse 67 10/06/21 0054   Resp 19 10/06/21 0054   SpO2 98 % 10/06/21 0054   Vitals shown include unvalidated device data.    Electronically Signed By: EMLLY UMANA CRNA  October 6, 2021  12:55 AM

## 2021-10-06 NOTE — H&P
[facility] MEDICINE ADMISSION HISTORY AND PHYSICAL       Assessment & Plan      1. Fracture dislocation, right hip - reduction was done in ED.   - IVF, NPO, anti emetics, pain meds  - Orthopedics eval  - CMS    2. Pre-op evaluation - Denies chest pain or shortness of breath. Denies history of CAD, CHF, severe valvular heart disease, or life threatening arrythmmia. Functional capacity - jogs 5x per week.  Based on RCRI, patient has low cardiac risk for contemplated orthopedic procedure. We did talk about risks including but not limited to: SCD, AMI, or CVA. No indication for pre-op beta blockade or stress dose steroids. Not on thinners.     Has no respiratory issues, no renal or liver impairment.     3. Mild WBC elevation to 12.5 - no source of infection       VTE prophylaxis: SCDs and ambulation, - HOLD subcutaneous heps ---- order when safe, sounds going to OR in AM   IV fluids: Per order set   Diet:  NPO  GI prophylaxis: Not indicated at this point, re-assess if needed.   Pain, sleep, and vomiting medications: Per order set  Code Status: Full   COVID test result:  Pending    COVID vaccination: completed   Barriers to discharge: admitting clinical condition  Discharge Disposition and goals:  Unable to determine at this point, pending clinical progress and response to treatment. Patient may need transfer to SNF or ACR if unsafe to go home and needed treatment inappropriate at home setting OR may need home health care evaluation if care can be delivered at home settings. Consider referral to care manager/         Care plan was created based on available information provided, including patient's condition at the time of encounter.   This plan was discussed with patient and/or family members using layman's terms and have agreed to proceed.   At the end of night shift (9PM - 730A), this case will be presented to the AM Hospitalist.    It is recommended to revise care plan if there  is change in condition and/or new clinical information that are not available during my encounter.     All or some of home medication/s were not resumed on admission due to safety reasons or contraindications. Dosing and frequency may also have been modified. Please resume/review them during your visit.     70 minutes of total visit duration and greater than 50% was spent in direct evaluation of patient and coordination of care including discussion of diagnostic test results and recommended treatment. .      Kayode Abel MD, MPH, FACP, [facility]  Internal Medicine - Hospitalist        Chief Complaint Right hip pain      HISTORY     - Met her in the ED. She was awake and interactive. Looked uncomfortable from right hip pain.  - She came in after an episode of fall while outside. She was jogging and stepped into a hole with RLE and felt severe pain. She was not able to get up. She jogs 5x/week. She was sent to the ED. She otherwise, doing OK. No CP or SOB. No abdominal pain.     - In the ED, XR showed - Unusual fracture extending somewhat vertically through the femoral head with a crescent-shaped medial fracture fragment of the femoral head remaining articulating with the acetabulum. Remainder of the femur is proximally subluxed and lies adjacent to the superolateral margin of the acetabulum.    - She was referred to Orthopedics. Reduction was done in ED.       - ROS --- No headache. No dizziness. No weakness. No CP or SOB. No palpitations. No abdominal pain. No nausea or vomiting. No urinary symptoms. No bleeding symptoms. No weight loss. Rest of 12 point ROS was reviewed and negative.       Past Medical History     No CAD or DM       Surgical History     Bunion surgery     Family History      Family History   Problem Relation Age of Onset     Breast Cancer No family hx of          Social History      .  Social History     Socioeconomic History     Marital status:      Spouse name: Not on file     Number of  children: Not on file     Years of education: Not on file     Highest education level: Not on file   Occupational History     Not on file   Tobacco Use     Smoking status: Not on file   Substance and Sexual Activity     Alcohol use: Not on file     Drug use: Not on file     Sexual activity: Not on file   Other Topics Concern     Not on file   Social History Narrative     Not on file     Social Determinants of Health     Financial Resource Strain:      Difficulty of Paying Living Expenses:    Food Insecurity:      Worried About Running Out of Food in the Last Year:      Ran Out of Food in the Last Year:    Transportation Needs:      Lack of Transportation (Medical):      Lack of Transportation (Non-Medical):    Physical Activity:      Days of Exercise per Week:      Minutes of Exercise per Session:    Stress:      Feeling of Stress :    Social Connections:      Frequency of Communication with Friends and Family:      Frequency of Social Gatherings with Friends and Family:      Attends Buddhist Services:      Active Member of Clubs or Organizations:      Attends Club or Organization Meetings:      Marital Status:    Intimate Partner Violence:      Fear of Current or Ex-Partner:      Emotionally Abused:      Physically Abused:      Sexually Abused:           Allergies      No Known Allergies      Prior to Admission Medications      No current facility-administered medications on file prior to encounter.  multivitamin, therapeutic (THERA-VIT) TABS tablet, Take 1 tablet by mouth At Bedtime          Review of Systems     A 12 point comprehensive review of systems was negative except as noted above in HPI.    PHYSICAL EXAMINATION       Vitals      Vitals: /71   Pulse 63   Temp 98  F (36.7  C) (Oral)   Resp 16   SpO2 100%   BMI= There is no height or weight on file to calculate BMI.      Examination     General Appearance:  Alert, cooperative, no distress  Head:    Normocephalic, without obvious abnormality,  atraumatic  EENT:  PERRL, conjunctiva/corneas clear, EOM's intact.   Neck:   Supple, symmetrical, trachea midline, no adenopathy; no NVE  Back:  Symmetric, no curvature, no CVA tenderness  Chest/Lungs: Clear to auscultation bilaterally, respirations unlabored, No tenderness or deformity. No abdominal breathing or use of accessory muscles.   Heart:    Regular rate and rhythm, S1 and S2 normal, no murmur, rub   or gallop  Abdomen: Soft, non-tender, bowel sounds active all four quadrants, not peritoneal on palpation. Not distended  Extremities:  Extremities normal, atraumatic, no swelling. Limited exams on RLE due to pain   Skin:  Skin color, texture, turgor normal, no rashes or lesion  Neurologic:  Awake and alert, No lateralizing or localizing signs        Pertinent Lab     Results for orders placed or performed during the hospital encounter of 10/05/21   XR Pelvis w Hip Right G/E 2 Views    Impression    IMPRESSION: Unusual fracture extending somewhat vertically through the femoral head with a crescent-shaped medial fracture fragment of the femoral head remaining articulating with the acetabulum. Remainder of the femur is proximally subluxed and lies   adjacent to the superolateral margin of the acetabulum. No other fracture identified.   XR Chest Port 1 View    Impression    IMPRESSION: Stomach is distended with air. Otherwise negative.   CBC with platelets and differential   Result Value Ref Range    WBC Count 12.5 (H) 4.0 - 11.0 10e3/uL    RBC Count 3.98 3.80 - 5.20 10e6/uL    Hemoglobin 12.0 11.7 - 15.7 g/dL    Hematocrit 35.5 35.0 - 47.0 %    MCV 89 78 - 100 fL    MCH 30.2 26.5 - 33.0 pg    MCHC 33.8 31.5 - 36.5 g/dL    RDW 11.7 10.0 - 15.0 %    Platelet Count 165 150 - 450 10e3/uL    % Neutrophils 85 %    % Lymphocytes 10 %    % Monocytes 5 %    % Eosinophils 0 %    % Basophils 0 %    % Immature Granulocytes 0 %    NRBCs per 100 WBC 0 <1 /100    Absolute Neutrophils 10.7 (H) 1.6 - 8.3 10e3/uL    Absolute  Lymphocytes 1.2 0.8 - 5.3 10e3/uL    Absolute Monocytes 0.6 0.0 - 1.3 10e3/uL    Absolute Eosinophils 0.0 0.0 - 0.7 10e3/uL    Absolute Basophils 0.0 0.0 - 0.2 10e3/uL    Absolute Immature Granulocytes 0.0 <=0.0 10e3/uL    Absolute NRBCs 0.0 10e3/uL           Pertinent Radiology

## 2021-10-06 NOTE — PLAN OF CARE
Problem: Adult Inpatient Plan of Care  Goal: Readiness for Transition of Care  Outcome: Improving      Patient alert and oriented. Had closed reduction of R hip fracture, admitted to unit approx. 0200. CMS intact to RLE. Hip abduction pillow in place. Patient was able to void. Received IV cefazolin x 1. IV fluids infusing per orders. Calls appropriately for assistance.

## 2021-10-06 NOTE — PHARMACY-ADMISSION MEDICATION HISTORY
Pharmacy Note - Admission Medication History    Pertinent Provider Information: None.   ______________________________________________________________________    Prior To Admission (PTA) med list completed and updated in EMR.       PTA Med List   Medication Sig Last Dose     multivitamin, therapeutic (THERA-VIT) TABS tablet Take 1 tablet by mouth At Bedtime 10/4/2021 at Unknown time     Information source(s): Patient, Clinic records and [facility]  Method of interview communication: in-person    Summary of Changes to PTA Med List  New: None  Discontinued: None  Changed: None    Patient was asked about OTC/herbal products specifically.  PTA med list reflects this.    In the past week, patient estimated taking medication this percent of the time:  greater than 90%.    Patient does not use any multi-dose medications prior to admission.    The information provided in this note is only as accurate as the sources available at the time of the update(s).    Thank you for the opportunity to participate in the care of this patient.    Kath Grider, PharmD, BCPS  10/5/2021 8:59 PM

## 2021-10-06 NOTE — SEDATION DOCUMENTATION
Pt drowsy. Wakes easily to voice. Xray complete. Daughter brought to pt room. Side rails up x2. Call light within reach.

## 2021-10-06 NOTE — OP NOTE
Operative Report    PATIENT Viviana Bowen   DATE OF SURGERY:  10/5/2021 - 10/6/2021      PREOPERATIVE DIAGNOSIS   Hip Fracture/dislocation, right (H) [S73.004A].    POSTOPERATIVE DIAGNOSIS   Hip fracture/dislocation, right (H) [S73.004A].    PROCEDURE PERFORMED   Closed reduction right hip fracture dislocation ([place] 1/2)    IMPLANTS  * No implants in log *    SURGEON  Mynor Tellez, DO     ASSISTANT   None    ANESTHESIA  Choice      FINDINGS:  Imaging demonstrates a fracture involving the right femoral head with superior posterior dislocation of the hip joint    SPECIMENS:  none    ESTIMATED BLOOD LOSS:  N/A    COMPLICATIONS   None.        INDICATION FOR PROCEDURE  Viviana Bowen is a 56 year old female who presented to the emergency department at [facility] via EMS.  She was out running.  She stepped in a hole and fell and was weight-bear due to pain.  Imaging demonstrates a shear fracture of the femoral head with a nonweightbearing fragment within the acetabulum the remainder of the femoral head was dislocated dorsally and posteriorly.  Close reduction was proposed to improve the patient's pain.      INFORMED CONSENT  Viviana Bowen was identified in the preoperative holding area and was identified using medical record number, name, and date of birth, all of which were confirmed. The operative extremity was marked using an indelible marker. Once again, all risks and benefits as well as postoperative course was discussed.    DESCRIPTION OF PROCEDURE   Viviana Bowen was brought back to the operating room.  Viviana was transferred to the operating room table.  IV sedation was administered.    A timeout was performed prior to the procedure.  Three separate staff members confirmed the patient's name, correct site and side of surgery and procedure being performed.  Antibiotics were confirmed to be given prior to incision.     While maintaining linear traction on the  right lower extremity succinylcholine was administered to facilitate muscle relaxation.  The muscles about the hip relaxed.  The right lower extremity was a deducted while applying tension and then abductor.  AP and lateral image demonstrated the intact femoral head to be reduced however, the head fragment was displaced from the acetabulum line proximal to the neck.  Reduction was tested by internally and externally rotating the right lower extremity.  An abduction pillow was placed.    Viviana tolerated the procedure well was transferred to the recovery cart and transported to the recovery room.    POSTOPERATIVE PLAN:  Once recovered from surgery, Viviana will be transported to a general medical floor room.  Plan for definitive treatment will be made by the care team including orthopedic surgery and internal medicine.      Mynor Tellez DO  [facility]

## 2021-10-06 NOTE — PROGRESS NOTES
[facility] [facility] [place]      Identification/Summary:      Viviana Bowen is a 56 year old female with a past medical history of no significant medical problems who was admitted on 10/5/2021 for fall, right hip dislocation, right femoral head fracture.     Assessment & Plan      Right hip dislocation  Right femoral head vertical fracture  Status post closed reduction  CT scan planned  Pain control with the Tylenol, oxycodone p.o. as needed IV Dilaudid  Awaiting further plan per orthopedics    Mild leukocytosis, stress response  Hyperchloremia       Diet: NPO for Medical/Clinical Reasons Except for: Meds, Ice Chips  DVT Prophylaxis: Aspirin per orthopedics  Lee Catheter: Not present  Central Lines: None    Code Status: Full Code    Discharge Planning   Anticipated Discharge in : Pending imaging, surgical plan likely 2 days  Milestones/Criteria For Discharge: See above      Interval History/Subjective:     Patient reports pain controlled at rest, hurts with any movement, denies any chest pain or shortness of breath abdominal pain or nausea or vomiting    Physical Exam/Objective:     Vitals I/O   Vital signs:  Temp: 98.4  F (36.9  C) Temp src: Oral BP: 108/64 Pulse: 81   Resp: 16 SpO2: 95 % O2 Device: None (Room air) Oxygen Delivery: 2 LPM   Weight: 56.7 kg (125 lb)  There is no height or weight on file to calculate BMI.       I/O last 3 completed shifts:  In: 1806 [I.V.:806; IV Piggyback:1000]  Out: 100 [Urine:100]     Vitals:    10/05/21 2141   Weight: 56.7 kg (125 lb)      Weight change:    There is no height or weight on file to calculate BMI.    General: Awake alert and comfortable  Lungs: CTA without rales or rhonchi  Heart: S1, S2 without murmurs  Abdomen: Soft nontender, bowel sounds positive  CNS: Nonfocal  Extremities: No edema      Medications:     Medications     sodium chloride 100 mL/hr at 10/06/21 0222       acetaminophen  975 mg Oral Q8H     aspirin  81 mg Oral BID      ceFAZolin  1 g Intravenous Q8H     [START ON 10/7/2021] polyethylene glycol  17 g Oral Daily     senna-docusate  1 tablet Oral BID     sodium chloride (PF)  3 mL Intracatheter Q8H     sodium chloride (PF)  3 mL Intracatheter Q8H       Data Reviewed   I personally reviewed all new medications, labs, imaging/diagnostics reports over the past 24 hours.     Pertinent findings include.     Labs    Recent Labs   Lab 10/05/21  2102   WBC 12.5*   HGB 12.0   MCV 89      INR 1.04      POTASSIUM 3.9   CHLORIDE 108*   CO2 22   BUN 18   CR 0.76   ANIONGAP 9   SATNAM 8.8   *       Imaging   Recent Results (from the past 24 hour(s))   XR Pelvis w Hip Right G/E 2 Views    Narrative    EXAM: XR PELVIS AND HIP RIGHT 2 VIEWS  LOCATION: [facility]  DATE/TIME: 10/5/2021 8:15 PM    INDICATION: Right hip pain after trauma.  COMPARISON: None.      Impression    IMPRESSION: Unusual fracture extending somewhat vertically through the femoral head with a crescent-shaped medial fracture fragment of the femoral head remaining articulating with the acetabulum. Remainder of the femur is proximally subluxed and lies   adjacent to the superolateral margin of the acetabulum. No other fracture identified.   XR Chest Port 1 View    Narrative    EXAM: XR CHEST PORT 1 VIEW  LOCATION: [facility]  DATE/TIME: 10/5/2021 8:57 PM    INDICATION: preop  COMPARISON: None.      Impression    IMPRESSION: Stomach is distended with air. Otherwise negative.   Hip  XR, right, 2 vw PORTABLE    Narrative    EXAM: XR HIP PORTABLE RIGHT 2-3 VIEWS  LOCATION: [facility]  DATE/TIME: 10/5/2021 10:17 PM    INDICATION: Hip reduction  COMPARISON: 10/05/2021      Impression    IMPRESSION: Appearance of the right hip is similar with vertical fracture through the medial femoral head with the fracture within the confines of the acetabulum on the frontal view but not well seen on the lateral  view. There is proximal and posterior   dislocation of the right proximal femur.    XR Surgery CHAIM  Fluoro G/T 5 Min    Narrative    This exam was marked as non-reportable because it will not be read by a   radiologist or a [place] non-radiologist provider.               EKG:      Shreya Domingo MD  Internal Medicine / Hospital medicine   [facility]  Securely message with the Vocera Web Console (learn more here)  Text page via [employer] (LiveOnDemand)

## 2021-10-07 ENCOUNTER — ANESTHESIA EVENT (OUTPATIENT)
Dept: SURGERY | Facility: CLINIC | Age: 56
DRG: 522 | End: 2021-10-07
Payer: COMMERCIAL

## 2021-10-07 ENCOUNTER — ANESTHESIA (OUTPATIENT)
Dept: SURGERY | Facility: CLINIC | Age: 56
DRG: 522 | End: 2021-10-07
Payer: COMMERCIAL

## 2021-10-07 ENCOUNTER — APPOINTMENT (OUTPATIENT)
Dept: RADIOLOGY | Facility: CLINIC | Age: 56
DRG: 522 | End: 2021-10-07
Attending: ORTHOPAEDIC SURGERY
Payer: COMMERCIAL

## 2021-10-07 LAB
ABO/RH(D): NORMAL
ANTIBODY SCREEN: NEGATIVE
GLUCOSE BLD-MCNC: 95 MG/DL (ref 70–125)
HGB BLD-MCNC: 10.7 G/DL (ref 11.7–15.7)
SPECIMEN EXPIRATION DATE: NORMAL

## 2021-10-07 PROCEDURE — C1776 JOINT DEVICE (IMPLANTABLE): HCPCS | Performed by: ORTHOPAEDIC SURGERY

## 2021-10-07 PROCEDURE — 85018 HEMOGLOBIN: CPT | Performed by: PHYSICIAN ASSISTANT

## 2021-10-07 PROCEDURE — 88364 INSITU HYBRIDIZATION (FISH): CPT | Mod: TC | Performed by: ORTHOPAEDIC SURGERY

## 2021-10-07 PROCEDURE — 250N000013 HC RX MED GY IP 250 OP 250 PS 637: Performed by: ORTHOPAEDIC SURGERY

## 2021-10-07 PROCEDURE — 88311 DECALCIFY TISSUE: CPT | Mod: TC | Performed by: ORTHOPAEDIC SURGERY

## 2021-10-07 PROCEDURE — 120N000001 HC R&B MED SURG/OB

## 2021-10-07 PROCEDURE — 258N000003 HC RX IP 258 OP 636: Performed by: INTERNAL MEDICINE

## 2021-10-07 PROCEDURE — 710N000010 HC RECOVERY PHASE 1, LEVEL 2, PER MIN: Performed by: ORTHOPAEDIC SURGERY

## 2021-10-07 PROCEDURE — 73502 X-RAY EXAM HIP UNI 2-3 VIEWS: CPT

## 2021-10-07 PROCEDURE — 250N000009 HC RX 250: Performed by: ANESTHESIOLOGY

## 2021-10-07 PROCEDURE — 250N000011 HC RX IP 250 OP 636: Performed by: ORTHOPAEDIC SURGERY

## 2021-10-07 PROCEDURE — 250N000009 HC RX 250: Performed by: NURSE ANESTHETIST, CERTIFIED REGISTERED

## 2021-10-07 PROCEDURE — 360N000077 HC SURGERY LEVEL 4, PER MIN: Performed by: ORTHOPAEDIC SURGERY

## 2021-10-07 PROCEDURE — 258N000003 HC RX IP 258 OP 636: Performed by: NURSE ANESTHETIST, CERTIFIED REGISTERED

## 2021-10-07 PROCEDURE — 272N000001 HC OR GENERAL SUPPLY STERILE: Performed by: ORTHOPAEDIC SURGERY

## 2021-10-07 PROCEDURE — C1713 ANCHOR/SCREW BN/BN,TIS/BN: HCPCS | Performed by: ORTHOPAEDIC SURGERY

## 2021-10-07 PROCEDURE — 999N000141 HC STATISTIC PRE-PROCEDURE NURSING ASSESSMENT: Performed by: ORTHOPAEDIC SURGERY

## 2021-10-07 PROCEDURE — 258N000003 HC RX IP 258 OP 636: Performed by: ORTHOPAEDIC SURGERY

## 2021-10-07 PROCEDURE — 0SR902A REPLACEMENT OF RIGHT HIP JOINT WITH METAL ON POLYETHYLENE SYNTHETIC SUBSTITUTE, UNCEMENTED, OPEN APPROACH: ICD-10-PCS | Performed by: ORTHOPAEDIC SURGERY

## 2021-10-07 PROCEDURE — 99232 SBSQ HOSP IP/OBS MODERATE 35: CPT | Performed by: INTERNAL MEDICINE

## 2021-10-07 PROCEDURE — 370N000017 HC ANESTHESIA TECHNICAL FEE, PER MIN: Performed by: ORTHOPAEDIC SURGERY

## 2021-10-07 PROCEDURE — 36415 COLL VENOUS BLD VENIPUNCTURE: CPT | Performed by: INTERNAL MEDICINE

## 2021-10-07 PROCEDURE — 250N000013 HC RX MED GY IP 250 OP 250 PS 637: Performed by: PHYSICIAN ASSISTANT

## 2021-10-07 PROCEDURE — 82947 ASSAY GLUCOSE BLOOD QUANT: CPT | Performed by: INTERNAL MEDICINE

## 2021-10-07 PROCEDURE — 86900 BLOOD TYPING SEROLOGIC ABO: CPT | Performed by: ORTHOPAEDIC SURGERY

## 2021-10-07 PROCEDURE — 250N000011 HC RX IP 250 OP 636: Performed by: NURSE ANESTHETIST, CERTIFIED REGISTERED

## 2021-10-07 PROCEDURE — 250N000009 HC RX 250: Performed by: ORTHOPAEDIC SURGERY

## 2021-10-07 DEVICE — IMPLANTABLE DEVICE: Type: IMPLANTABLE DEVICE | Site: HIP | Status: FUNCTIONAL

## 2021-10-07 DEVICE — IMP STEM FEMORAL HIP STRK ACCOLADE II 127DEG SZ 3 6721-0330: Type: IMPLANTABLE DEVICE | Site: HIP | Status: FUNCTIONAL

## 2021-10-07 DEVICE — IMP SCR STRK LOW PROFILE HEX 6.5X30MM 7030-6530: Type: IMPLANTABLE DEVICE | Site: HIP | Status: FUNCTIONAL

## 2021-10-07 RX ORDER — KETAMINE HYDROCHLORIDE 10 MG/ML
INJECTION INTRAMUSCULAR; INTRAVENOUS PRN
Status: DISCONTINUED | OUTPATIENT
Start: 2021-10-07 | End: 2021-10-07

## 2021-10-07 RX ORDER — CEFAZOLIN SODIUM 2 G/100ML
2 INJECTION, SOLUTION INTRAVENOUS SEE ADMIN INSTRUCTIONS
Status: DISCONTINUED | OUTPATIENT
Start: 2021-10-07 | End: 2021-10-07 | Stop reason: HOSPADM

## 2021-10-07 RX ORDER — EPHEDRINE SULFATE 50 MG/ML
INJECTION, SOLUTION INTRAMUSCULAR; INTRAVENOUS; SUBCUTANEOUS PRN
Status: DISCONTINUED | OUTPATIENT
Start: 2021-10-07 | End: 2021-10-07

## 2021-10-07 RX ORDER — FENTANYL CITRATE 50 UG/ML
25 INJECTION, SOLUTION INTRAMUSCULAR; INTRAVENOUS
Status: CANCELLED | OUTPATIENT
Start: 2021-10-07

## 2021-10-07 RX ORDER — ACETAMINOPHEN 325 MG/1
975 TABLET ORAL EVERY 8 HOURS
Status: DISCONTINUED | OUTPATIENT
Start: 2021-10-07 | End: 2021-10-07

## 2021-10-07 RX ORDER — ONDANSETRON 2 MG/ML
4 INJECTION INTRAMUSCULAR; INTRAVENOUS EVERY 30 MIN PRN
Status: DISCONTINUED | OUTPATIENT
Start: 2021-10-07 | End: 2021-10-07 | Stop reason: HOSPADM

## 2021-10-07 RX ORDER — PROPOFOL 10 MG/ML
INJECTION, EMULSION INTRAVENOUS CONTINUOUS PRN
Status: DISCONTINUED | OUTPATIENT
Start: 2021-10-07 | End: 2021-10-07

## 2021-10-07 RX ORDER — ACETAMINOPHEN 325 MG/1
650 TABLET ORAL EVERY 4 HOURS PRN
Status: DISCONTINUED | OUTPATIENT
Start: 2021-10-10 | End: 2021-10-07

## 2021-10-07 RX ORDER — BUPIVACAINE HYDROCHLORIDE 5 MG/ML
INJECTION, SOLUTION EPIDURAL; INTRACAUDAL
Status: COMPLETED | OUTPATIENT
Start: 2021-10-07 | End: 2021-10-07

## 2021-10-07 RX ORDER — ONDANSETRON 4 MG/1
4 TABLET, ORALLY DISINTEGRATING ORAL EVERY 6 HOURS PRN
Status: DISCONTINUED | OUTPATIENT
Start: 2021-10-07 | End: 2021-10-07

## 2021-10-07 RX ORDER — AMOXICILLIN 250 MG
1 CAPSULE ORAL 2 TIMES DAILY
Status: DISCONTINUED | OUTPATIENT
Start: 2021-10-07 | End: 2021-10-07

## 2021-10-07 RX ORDER — LIDOCAINE 40 MG/G
CREAM TOPICAL
Status: DISCONTINUED | OUTPATIENT
Start: 2021-10-07 | End: 2021-10-10 | Stop reason: HOSPADM

## 2021-10-07 RX ORDER — SODIUM CHLORIDE, SODIUM LACTATE, POTASSIUM CHLORIDE, CALCIUM CHLORIDE 600; 310; 30; 20 MG/100ML; MG/100ML; MG/100ML; MG/100ML
INJECTION, SOLUTION INTRAVENOUS CONTINUOUS PRN
Status: DISCONTINUED | OUTPATIENT
Start: 2021-10-07 | End: 2021-10-07

## 2021-10-07 RX ORDER — BISACODYL 10 MG
10 SUPPOSITORY, RECTAL RECTAL DAILY PRN
Status: DISCONTINUED | OUTPATIENT
Start: 2021-10-07 | End: 2021-10-07

## 2021-10-07 RX ORDER — FENTANYL CITRATE 50 UG/ML
INJECTION, SOLUTION INTRAMUSCULAR; INTRAVENOUS PRN
Status: DISCONTINUED | OUTPATIENT
Start: 2021-10-07 | End: 2021-10-07

## 2021-10-07 RX ORDER — CEFAZOLIN SODIUM 2 G/100ML
2 INJECTION, SOLUTION INTRAVENOUS
Status: COMPLETED | OUTPATIENT
Start: 2021-10-07 | End: 2021-10-07

## 2021-10-07 RX ORDER — HYDROMORPHONE HCL IN WATER/PF 6 MG/30 ML
0.2 PATIENT CONTROLLED ANALGESIA SYRINGE INTRAVENOUS EVERY 5 MIN PRN
Status: DISCONTINUED | OUTPATIENT
Start: 2021-10-07 | End: 2021-10-07 | Stop reason: HOSPADM

## 2021-10-07 RX ORDER — OXYCODONE HYDROCHLORIDE 5 MG/1
10 TABLET ORAL EVERY 4 HOURS PRN
Status: DISCONTINUED | OUTPATIENT
Start: 2021-10-07 | End: 2021-10-07

## 2021-10-07 RX ORDER — GLYCOPYRROLATE 0.2 MG/ML
INJECTION, SOLUTION INTRAMUSCULAR; INTRAVENOUS PRN
Status: DISCONTINUED | OUTPATIENT
Start: 2021-10-07 | End: 2021-10-07

## 2021-10-07 RX ORDER — PROCHLORPERAZINE MALEATE 10 MG
10 TABLET ORAL EVERY 6 HOURS PRN
Status: DISCONTINUED | OUTPATIENT
Start: 2021-10-07 | End: 2021-10-07

## 2021-10-07 RX ORDER — ASPIRIN 81 MG/1
81 TABLET ORAL 2 TIMES DAILY
Status: DISCONTINUED | OUTPATIENT
Start: 2021-10-07 | End: 2021-10-07

## 2021-10-07 RX ORDER — CEFADROXIL 500 MG/1
500 CAPSULE ORAL 2 TIMES DAILY
Qty: 14 CAPSULE | Refills: 0 | Status: SHIPPED | OUTPATIENT
Start: 2021-10-07

## 2021-10-07 RX ORDER — SODIUM CHLORIDE, SODIUM LACTATE, POTASSIUM CHLORIDE, CALCIUM CHLORIDE 600; 310; 30; 20 MG/100ML; MG/100ML; MG/100ML; MG/100ML
INJECTION, SOLUTION INTRAVENOUS CONTINUOUS
Status: DISCONTINUED | OUTPATIENT
Start: 2021-10-07 | End: 2021-10-10 | Stop reason: HOSPADM

## 2021-10-07 RX ORDER — OXYCODONE HYDROCHLORIDE 5 MG/1
5 TABLET ORAL EVERY 4 HOURS PRN
Status: DISCONTINUED | OUTPATIENT
Start: 2021-10-07 | End: 2021-10-07 | Stop reason: HOSPADM

## 2021-10-07 RX ORDER — ONDANSETRON 2 MG/ML
4 INJECTION INTRAMUSCULAR; INTRAVENOUS EVERY 6 HOURS PRN
Status: DISCONTINUED | OUTPATIENT
Start: 2021-10-07 | End: 2021-10-07

## 2021-10-07 RX ORDER — MAGNESIUM HYDROXIDE 1200 MG/15ML
LIQUID ORAL PRN
Status: DISCONTINUED | OUTPATIENT
Start: 2021-10-07 | End: 2021-10-07 | Stop reason: HOSPADM

## 2021-10-07 RX ORDER — FENTANYL CITRATE 50 UG/ML
25 INJECTION, SOLUTION INTRAMUSCULAR; INTRAVENOUS EVERY 5 MIN PRN
Status: DISCONTINUED | OUTPATIENT
Start: 2021-10-07 | End: 2021-10-07 | Stop reason: HOSPADM

## 2021-10-07 RX ORDER — MEPERIDINE HYDROCHLORIDE 50 MG/ML
12.5 INJECTION INTRAMUSCULAR; INTRAVENOUS; SUBCUTANEOUS
Status: DISCONTINUED | OUTPATIENT
Start: 2021-10-07 | End: 2021-10-07 | Stop reason: HOSPADM

## 2021-10-07 RX ORDER — OXYCODONE HYDROCHLORIDE 5 MG/1
5 TABLET ORAL EVERY 4 HOURS PRN
Status: DISCONTINUED | OUTPATIENT
Start: 2021-10-07 | End: 2021-10-07

## 2021-10-07 RX ORDER — CEFAZOLIN SODIUM 1 G/3ML
1 INJECTION, POWDER, FOR SOLUTION INTRAMUSCULAR; INTRAVENOUS EVERY 8 HOURS
Status: COMPLETED | OUTPATIENT
Start: 2021-10-07 | End: 2021-10-08

## 2021-10-07 RX ORDER — ONDANSETRON 4 MG/1
4 TABLET, ORALLY DISINTEGRATING ORAL EVERY 30 MIN PRN
Status: DISCONTINUED | OUTPATIENT
Start: 2021-10-07 | End: 2021-10-07 | Stop reason: HOSPADM

## 2021-10-07 RX ORDER — TRANEXAMIC ACID 650 MG/1
1950 TABLET ORAL ONCE
Status: COMPLETED | OUTPATIENT
Start: 2021-10-07 | End: 2021-10-07

## 2021-10-07 RX ORDER — SODIUM CHLORIDE, SODIUM LACTATE, POTASSIUM CHLORIDE, CALCIUM CHLORIDE 600; 310; 30; 20 MG/100ML; MG/100ML; MG/100ML; MG/100ML
INJECTION, SOLUTION INTRAVENOUS CONTINUOUS
Status: DISCONTINUED | OUTPATIENT
Start: 2021-10-07 | End: 2021-10-07 | Stop reason: HOSPADM

## 2021-10-07 RX ORDER — POLYETHYLENE GLYCOL 3350 17 G/17G
17 POWDER, FOR SOLUTION ORAL DAILY
Status: DISCONTINUED | OUTPATIENT
Start: 2021-10-08 | End: 2021-10-07

## 2021-10-07 RX ORDER — PROPOFOL 10 MG/ML
INJECTION, EMULSION INTRAVENOUS PRN
Status: DISCONTINUED | OUTPATIENT
Start: 2021-10-07 | End: 2021-10-07

## 2021-10-07 RX ADMIN — KETAMINE HYDROCHLORIDE 25 MG: 10 INJECTION, SOLUTION INTRAMUSCULAR; INTRAVENOUS at 13:25

## 2021-10-07 RX ADMIN — GLYCOPYRROLATE 0.3 MG: 0.2 INJECTION, SOLUTION INTRAMUSCULAR; INTRAVENOUS at 13:54

## 2021-10-07 RX ADMIN — Medication 5 MG: at 13:58

## 2021-10-07 RX ADMIN — PROPOFOL 30 MG: 10 INJECTION, EMULSION INTRAVENOUS at 13:25

## 2021-10-07 RX ADMIN — OXYCODONE HYDROCHLORIDE 5 MG: 5 TABLET ORAL at 10:44

## 2021-10-07 RX ADMIN — ACETAMINOPHEN 975 MG: 325 TABLET ORAL at 10:44

## 2021-10-07 RX ADMIN — ACETAMINOPHEN 975 MG: 325 TABLET ORAL at 18:53

## 2021-10-07 RX ADMIN — PHENYLEPHRINE HYDROCHLORIDE 200 MCG: 10 INJECTION INTRAVENOUS at 13:52

## 2021-10-07 RX ADMIN — FENTANYL CITRATE 50 MCG: 50 INJECTION, SOLUTION INTRAMUSCULAR; INTRAVENOUS at 13:24

## 2021-10-07 RX ADMIN — PHENYLEPHRINE HYDROCHLORIDE 200 MCG: 10 INJECTION INTRAVENOUS at 14:55

## 2021-10-07 RX ADMIN — OXYCODONE HYDROCHLORIDE 5 MG: 5 TABLET ORAL at 21:25

## 2021-10-07 RX ADMIN — TRANEXAMIC ACID 1950 MG: 650 TABLET ORAL at 12:32

## 2021-10-07 RX ADMIN — ASPIRIN 81 MG: 81 TABLET, COATED ORAL at 21:04

## 2021-10-07 RX ADMIN — Medication 10 MG: at 14:18

## 2021-10-07 RX ADMIN — FENTANYL CITRATE 50 MCG: 50 INJECTION, SOLUTION INTRAMUSCULAR; INTRAVENOUS at 13:18

## 2021-10-07 RX ADMIN — SODIUM CHLORIDE, POTASSIUM CHLORIDE, SODIUM LACTATE AND CALCIUM CHLORIDE: 600; 310; 30; 20 INJECTION, SOLUTION INTRAVENOUS at 18:53

## 2021-10-07 RX ADMIN — SODIUM CHLORIDE, POTASSIUM CHLORIDE, SODIUM LACTATE AND CALCIUM CHLORIDE: 600; 310; 30; 20 INJECTION, SOLUTION INTRAVENOUS at 13:18

## 2021-10-07 RX ADMIN — Medication 10 MG: at 14:06

## 2021-10-07 RX ADMIN — ACETAMINOPHEN 975 MG: 325 TABLET ORAL at 03:11

## 2021-10-07 RX ADMIN — SENNOSIDES AND DOCUSATE SODIUM 1 TABLET: 50; 8.6 TABLET ORAL at 21:04

## 2021-10-07 RX ADMIN — BUPIVACAINE HYDROCHLORIDE 2.5 ML: 5 INJECTION, SOLUTION EPIDURAL; INTRACAUDAL; PERINEURAL at 13:31

## 2021-10-07 RX ADMIN — CEFAZOLIN SODIUM 2 G: 2 INJECTION, SOLUTION INTRAVENOUS at 13:18

## 2021-10-07 RX ADMIN — MIDAZOLAM 2 MG: 1 INJECTION INTRAMUSCULAR; INTRAVENOUS at 13:17

## 2021-10-07 RX ADMIN — SODIUM CHLORIDE: 9 INJECTION, SOLUTION INTRAVENOUS at 09:45

## 2021-10-07 RX ADMIN — PROPOFOL 100 MCG/KG/MIN: 10 INJECTION, EMULSION INTRAVENOUS at 13:37

## 2021-10-07 RX ADMIN — CEFAZOLIN 1 G: 1 INJECTION, POWDER, FOR SOLUTION INTRAMUSCULAR; INTRAVENOUS at 21:05

## 2021-10-07 RX ADMIN — SODIUM CHLORIDE, POTASSIUM CHLORIDE, SODIUM LACTATE AND CALCIUM CHLORIDE: 600; 310; 30; 20 INJECTION, SOLUTION INTRAVENOUS at 15:05

## 2021-10-07 RX ADMIN — PHENYLEPHRINE HYDROCHLORIDE 200 MCG: 10 INJECTION INTRAVENOUS at 14:40

## 2021-10-07 ASSESSMENT — ACTIVITIES OF DAILY LIVING (ADL): DEPENDENT_IADLS:: INDEPENDENT

## 2021-10-07 NOTE — ANESTHESIA CARE TRANSFER NOTE
Patient: Viviana Bowen    Procedure: Procedure(s):  ARTHROPLASTY, HIP, TOTAL       Diagnosis: Hip fracture, right, closed, initial encounter (H) [S72.001A]  Diagnosis Additional Information: No value filed.    Anesthesia Type:   Spinal     Note:    Oropharynx: oropharynx clear of all foreign objects and spontaneously breathing  Level of Consciousness: awake  Oxygen Supplementation: room air    Independent Airway: airway patency satisfactory and stable  Dentition: dentition unchanged  Vital Signs Stable: post-procedure vital signs reviewed and stable  Report to RN Given: handoff report given  Patient transferred to: PACU    Handoff Report: Identifed the Patient, Identified the Reponsible Provider, Reviewed the pertinent medical history, Discussed the surgical course, Reviewed Intra-OP anesthesia mangement and issues during anesthesia, Set expectations for post-procedure period and Allowed opportunity for questions and acknowledgement of understanding      Vitals:  Vitals Value Taken Time   /67 10/07/21 1539   Temp     Pulse 74 10/07/21 1539   Resp 12 10/07/21 1539   SpO2     Vitals shown include unvalidated device data.    Electronically Signed By: MELLY Hess CRNA  October 7, 2021  3:40 PM

## 2021-10-07 NOTE — ANESTHESIA PROCEDURE NOTES
Intrathecal injection Procedure Note    Pre-Procedure   Staff -        Performed By: anesthesiologist       Location: OR       Pre-Anesthestic Checklist: patient identified, IV checked, risks and benefits discussed, informed consent, monitors and equipment checked, pre-op evaluation, at physician/surgeon's request and post-op pain management  Timeout:       Correct Patient: Yes        Correct Procedure: Yes        Correct Site: Yes        Correct Position: Yes   Procedure Documentation  Procedure: intrathecal injection       Patient Position: sitting       Skin prep: Chloraprep       Insertion Site: L3-4. (midline approach).       Needle Gauge: 24.        Needle Length (Inches): 3.5        Spinal Needle Type: Pencan       Introducer used      # of attempts: 1 and  # of redirects:     Assessment/Narrative         Paresthesias: No.       CSF fluid: clear.    Medication(s) Administered   0.5% Bupivacaine PF (Intrathecal), 2.5 mL  Medication Administration Time: 10/7/2021 1:31 PM

## 2021-10-07 NOTE — OP NOTE
Operative Report    PATIENT Viviana Bowen   DATE OF SURGERY:  10/5/2021 - 10/7/2021    PREOPERATIVE DIAGNOSIS   Hip fracture, right, closed, initial encounter (H) [S72.001A].    POSTOPERATIVE DIAGNOSIS   Hip fracture, right, closed, initial encounter (H) [S72.001A].    PROCEDURE PERFORMED   right total hip arthroplasty, posterior approach    IMPLANTS  Implant Name Type Inv. Item Serial No.  Lot No. LRB No. Used Action   TRIDENT II TRITANIUM CLUSTERHOLE 46C - [id number] Total Joint Component/Insert TRIDENT II TRITANIUM CLUSTERHOLE 46C  [employer] [id number] Right 1 Implanted   IMP SCR STRK LOW PROFILE HEX 6.5X30MM [id number] - [id number] Metallic Hardware/[place] IMP SCR STRK LOW PROFILE HEX 6.5X30MM [id number]  [employer] [id number] Right 1 Implanted   INSERT ACETABULAR MDM LINER 22.2X36MM - [id number] Total Joint Component/Insert INSERT ACETABULAR MDM LINER 22.2X36MM  [employer] [id number] Right 1 Implanted   IMP STEM FEMORAL HIP STRK ACCOLADE II 127DEG SZ 3 [id number] - [id number] Total Joint Component/Insert IMP STEM FEMORAL HIP STRK ACCOLADE II 127DEG SZ 3 9996-2163  [employer] [id number] Right 1 Implanted   LINER MDM 36 C - [id number] Total Joint Component/Insert LINER MDM 36 C  [employer] [id number] Right 1 Implanted   IMP HEAD FEM STRK V40 VITALLIM COCR 22.2MM +0MM 6260-4-122 - [id number] Total Joint Component/Insert IMP HEAD FEM STRK V40 VITALLIM COCR 22.2MM +0MM 6260-4-122  [employer] [id number] Right 1 Implanted       SURGEON  Boston Vargas MD    ASSISTANT   Allie Galvez PA-C; assistant was required for patient positioning, surgical assistance, wound closure and monitoring patient's safety throughout the case.    ANESTHESIA  Choice      FINDINGS:  Intra-articular comminuted femoral head fracture extending down into the femoral neck    SPECIMENS:  Bone fragments sent for permanent section.    ESTIMATED BLOOD LOSS:  300 cc    COMPLICATIONS   None.         INDICATION FOR PROCEDURE  Viviana Bowen is a 56 year old female with a right femoral head fracture.  She sustained this 2 days ago during an awkward step while running.  She had immediate pain and inability to bear weight.  She is brought to the emergency department where she was found to have a femoral head fracture with a posterior dislocation of her hip.  This was closed reduced.  Given her age and activity level as well as the comminuted intra-articular nature of this fracture, she was indicated for total hip arthroplasty.  Posterior approach was chosen in this case secondary to CT scan showing a large posteriorly incarcerated fragment behind her posterior wall.  No acetabular fractures were noted on the CT scan.      Given that this is typically a high-energy type injury, there was some concern that this represented a pathologic fracture.  We discussed the situation directly with radiology who again reviewed the CT scan did not have any concern for a pathologic nature of this fracture.  As such, we deferred frozen sections at the time of surgery and elected to just simply send the bone specimen for permanent section to rule this out.    Risks and benefits were explained her and she elects to proceed.    PREOPERATIVE EXAMINATION:   Seen in the preoperative area.  Alert and interactive no acute distress peer examination of the right hip shows no overlying skin lesions.  She is able to fire her dorsiflexors and plantar flexors equally, has good sensation in the deep peroneal, superficial peroneal, tibial nerve distributions.  Probable dorsalis pedis pulse and her foot is warm and well-perfused.    INFORMED CONSENT  Viviana Bowen was identified in the preoperative holding area and was identified using medical record number, name, and date of birth, all of which were confirmed. The operative extremity was marked using an indelible marker. Once again, all risks and benefits as well as  alternatives to surgical intervention were discussed with the patient in detail and all their questions were answered. Risks discussed included but were not limited to: Instability, leg length discrepancy, persistent pain, infection, bleeding, scarring, stiffness, thromboembolic events, fracture, implant complications, severe limb dysfunction, loss of limb, and loss of life. The patient signed informed consent and wished to proceed with surgery as scheduled.     DESCRIPTION OF PROCEDURE   Viviana Bowen was brought back to the operating room.  Spinal anesthesia was achieved without difficulty.  The patient was then transferred to the OR table.  She was placed in the lateral decubitus position with the right side up with an axillary roll.  All bony prominences were well-padded. The patient was then prepped and draped in the usual sterile fashion.    A timeout was performed prior to the procedure.  Three separate staff members confirmed the patient's name, correct site and side of surgery and procedure being performed.  Antibiotics were confirmed to be given prior to incision.  Tranexamic acid was given orally in the preoperative area.    Standard posterior approach was utilized.  It came sharply down through the skin, electrocautery through the soft tissues and identified the IT band which was divided.  Charnley retractor was inserted.  The posterior capsular tissues were found to be grossly inflamed and edematous.   Given that on its preoperative CT scan she had a large posterior fragment of her femoral head, I elected to start my capsulotomy inferior at the junction of the gluteal sling and the vastus lateralis.  I raised the posterior capsule in 1 continuous flap extending superiorly, being very careful not to push the femoral head fragment any further posteriorly.  As soon as I was able to extend the capsulotomy superiorly enough that identified this fragment, I gently removed it again being very  careful to not damage the sciatic nerve.  The superior capsule was found to be damaged and basically nonexistent, and this had all been stripped off of her entire posterior wall which was easily visible.  At this point, expose the proximal femur and rotated the remaining neck up and out of the wound.  The fracture did apparently extend into the femoral neck as there was no head fragments in continuity with the femoral neck.  At this point, I made a neck cut, measuring about 2 cm above the lesser trochanter.  At this point, I turned my attention to the acetabulum.    Hansen hook retractor was inserted over the anterior wall and the femur was subluxed forward.  The remaining bone fragments were removed from the acetabulum.  At this point, all of the head fragments including the neck cut were sent for pathology given the low energy nature of this fracture.  It should be noted that all of the bone at this point was osteoporotic in appearance, but did not give the sense of there being a lesion within it.    I then began sequentially reaming the acetabulum.  Began with a 44 mm reamer medialized and then sized up to a 46.  A size 46, had good circumferential bleeding bone which was cancellous in nature and as such I felt that this was the appropriate size.  A 46 mm multihole shell was then opened and impacted in the appropriate inclination and anteversion utilizing the targeting guide on the insertion handle.  At this point, I ensured that the acetabular component was down through the central hole and placed the trial liner.  Attention was then turned back to the femur.    Proximal femur was opened with a box osteotome.  I used a rongeur to ensure that I was lateral enough.  Then began sequentially broaching, stopping between broaches 1 into 2 lateralized again with a curette.  I sequentially broached up to a size 3 which gave good fit and rotational stability.  I then trialed the hip with a 127 degree neck to ensure  that I reconstructed her offset.  With these trial components in place, we flexed the hip up to 90 degrees and internally rotated to about 65 or 70 degrees prior to impingement and dislocation.  I was not able to anteriorly sublux the hip with extension and external rotation.  As such I felt that we had good stability but could maybe use a few more degrees of internal rotation stability.  Given the small size of the acetabulum, I did feel that dual mobility would be helpful in this situation.    The hip was dislocated and the stem was removed.  The trial liner was removed.  I placed a single 30 mm screw superiorly in the acetabulum with excellent purchase.  The dual mobility liner was then placed into a cleaned acetabulum and ensured to be down circumferentially.  The proximal femur was then reexposed and we inserted the final 127 degree size 3 Accolade 2 femoral stem.  I watched the calcar closely for fracture as this was inserted and no fractures were appreciated.  Again we had good rotational stability of the stem.  The inner head was then opened as well as the polyethylene outer head and these were assembled using the head press on the back table.  This was then inserted onto a cleaned and dried trunnion and the hip was reduced and we had a stability exam similar to a little bit better in terms of right internal rotation at 90 degrees of flexion then noted above.    The wound and implants were then thoroughly irrigated.  Local injection was utilized throughout.  At this point, I repaired the capsular tissue as best I could however it was damaged from her dislocation event.  The IT band was then repaired with #5 Ethibond followed by a #1 running strata fix.  The subcutaneous tissues were then closed with 2-0 Vicryl followed by 3-0 running Monocryl in the skin followed by skin glue followed by a Mepilex dressing.    All sponge and instrument counts are correct at the end the procedure and she was taken to recovery  in stable condition.    POSTOPERATIVE EXAM:  Palpable dorsalis pedis pulse.    POSTOPERATIVE PLAN:  Pain control.  24 hours IV antibiotics, followed by 1 week of oral cefadroxil.  Aspirin for DVT prophylaxis as well as mechanical DVT prophylaxis.  Weightbearing as tolerated, right lower extremity with posterior hip precautions.  X-rays to be obtained in PACU. Follow up pathology results.  Dispositional planning.    Boston Vargas MD  [facility]

## 2021-10-07 NOTE — ANESTHESIA PREPROCEDURE EVALUATION
Anesthesia Pre-Procedure Evaluation    Patient: Viviana Bowen   MRN: [id number] : 1965        Preoperative Diagnosis: Hip fracture, right, closed, initial encounter (H) [S72.001A]    Procedure : Procedure(s):  ARTHROPLASTY, HIP, TOTAL          Past Medical History:   Diagnosis Date     Motion sickness       Past Surgical History:   Procedure Laterality Date     CLOSED REDUCTION HIP Right 10/6/2021    Procedure: CLOSED REDUCTION, HIP RIGHT;  Surgeon: Mynor Tellez DO;  Location: [facility] Main OR      No Known Allergies   Social History     Tobacco Use     Smoking status: Never Smoker     Smokeless tobacco: Never Used   Substance Use Topics     Alcohol use: Yes     Comment: 1 a day      Wt Readings from Last 1 Encounters:   10/06/21 69.4 kg (152 lb 14.4 oz)        Anesthesia Evaluation   Pt has had prior anesthetic. Type: General.    No history of anesthetic complications       ROS/MED HX  ENT/Pulmonary:  - neg pulmonary ROS     Neurologic:  - neg neurologic ROS     Cardiovascular:  - neg cardiovascular ROS     METS/Exercise Tolerance:     Hematologic:  - neg hematologic  ROS     Musculoskeletal:  - neg musculoskeletal ROS     GI/Hepatic:  - neg GI/hepatic ROS     Renal/Genitourinary:  - neg Renal ROS     Endo:  - neg endo ROS     Psychiatric/Substance Use:  - neg psychiatric ROS     Infectious Disease:  - neg infectious disease ROS     Malignancy:  - neg malignancy ROS     Other:            Physical Exam    Airway        Mallampati: II   TM distance: > 3 FB   Neck ROM: full     Respiratory Devices and Support         Dental  no notable dental history         Cardiovascular   cardiovascular exam normal          Pulmonary   pulmonary exam normal                OUTSIDE LABS:  CBC:   Lab Results   Component Value Date    WBC 10.7 10/06/2021    WBC 12.5 (H) 10/05/2021    HGB 10.7 (L) 10/07/2021    HGB 11.2 (L) 10/06/2021    HCT 33.7 (L) 10/06/2021    HCT 35.5 10/05/2021     (L) 10/06/2021      10/05/2021     BMP:   Lab Results   Component Value Date     10/06/2021     10/05/2021    POTASSIUM 4.2 10/06/2021    POTASSIUM 3.9 10/05/2021    CHLORIDE 110 (H) 10/06/2021    CHLORIDE 108 (H) 10/05/2021    CO2 20 (L) 10/06/2021    CO2 22 10/05/2021    BUN 11 10/06/2021    BUN 18 10/05/2021    CR 0.77 10/06/2021    CR 0.76 10/05/2021    GLC 95 10/07/2021     10/06/2021     COAGS:   Lab Results   Component Value Date    INR 1.04 10/05/2021     POC: No results found for: BGM, HCG, HCGS  HEPATIC:   Lab Results   Component Value Date    ALBUMIN 3.3 (L) 10/06/2021    PROTTOTAL 6.1 10/06/2021    ALT 14 10/06/2021    AST 19 10/06/2021    ALKPHOS 56 10/06/2021    BILITOTAL 0.7 10/06/2021     OTHER:   Lab Results   Component Value Date    SATNAM 8.4 (L) 10/06/2021       Anesthesia Plan    ASA Status:  2, emergent    NPO Status:  NPO Appropriate    Anesthesia Type: Spinal.              Consents    Anesthesia Plan(s) and associated risks, benefits, and realistic alternatives discussed. Questions answered and patient/representative(s) expressed understanding.     - Discussed with:  Patient         Postoperative Care       PONV prophylaxis: Dexamethasone or Solumedrol, Ondansetron (or other 5HT-3)     Comments:                Carol Ramesh MD

## 2021-10-07 NOTE — PLAN OF CARE
"Pt complained of severe pain during shift. Initially refused pain medications but requested a dose close to surgery time due to movement getting ready for surgery. Using purewick in bed, adequate urine output. Bedrest maintained. Pt was tearful during shift, expressing fear about recovery time. RN talked with pt about support she has at home and pt replied, \"I am planning to do it alone, but I can call friends\". Foot-pumps on.     Patient vital signs are at baseline: Yes  Patient able to ambulate as they were prior to admission or with assist devices provided by therapies during their stay:  No,  Reason:  Bedrest  Patient MUST void prior to discharge:  Yes  Patient able to tolerate oral intake:  No,  Reason:  NPO for surgery   Pain has adequate pain control using Oral analgesics:  Yes     Spoke with [facility] nurse ~1245 for report. Pt was placed in gown and preped for surgery. Transported to [facility] by transport team in pt bed.   "

## 2021-10-07 NOTE — PLAN OF CARE
Problem: Adult Inpatient Plan of Care  Goal: Plan of Care Review  Outcome: No Change   Patient is alert and oriented, patient is an assist of 2 but is on bedrest and is not moving much at all and is awaiting surgery. Patient is NPO for potential surgery later today. Patient has been somewhat painful but is not keen on opioids.   Patient vital signs are at baseline: Yes  Patient able to ambulate as they were prior to admission or with assist devices provided by therapies during their stay:  No,  Reason:  bedrest  Patient MUST void prior to discharge:  Yes  Patient able to tolerate oral intake:  Yes  Pain has adequate pain control using Oral analgesics:  Yes

## 2021-10-07 NOTE — PLAN OF CARE
I was contacted by my partner, Dr. Stephon Stafford, this morning about Viviana, who is a 56-year-old female with a right femoral head fracture dislocation.  Apparently, she was running and had acute onset hip pain was found to have a fracture dislocation which was subsequently reduced.  CT scan has been obtained and she does not have a fracture of the acetabulum posterior wall is intact.  Highly complex comminuted fracture of the femoral head with grossly displaced fragment.  I think that she would be best served with a total hip arthroplasty.  She is n.p.o., will plan for this this afternoon.      Given the low-energy mechanism for this fracture, which is typically a high-energy type fracture, would plan to send bone fragments for pathology at the time of surgery.    Boston Vargas MD   [facility]  10/7/2021

## 2021-10-07 NOTE — ANESTHESIA CARE TRANSFER NOTE
Patient: Viviana Bowen    Procedure: Procedure(s):  ARTHROPLASTY, HIP, TOTAL       Diagnosis: Hip fracture, right, closed, initial encounter (H) [S72.001A]  Diagnosis Additional Information: No value filed.    Anesthesia Type:   Spinal     Note:    Oropharynx: oropharynx clear of all foreign objects and spontaneously breathing  Level of Consciousness: awake  Oxygen Supplementation: room air    Independent Airway: airway patency satisfactory and stable  Dentition: dentition unchanged  Vital Signs Stable: post-procedure vital signs reviewed and stable  Report to RN Given: handoff report given  Patient transferred to: PACU    Handoff Report: Identifed the Patient, Identified the Reponsible Provider, Reviewed the pertinent medical history, Discussed the surgical course, Reviewed Intra-OP anesthesia mangement and issues during anesthesia, Set expectations for post-procedure period and Allowed opportunity for questions and acknowledgement of understanding      Vitals:  Vitals Value Taken Time   /66 10/07/21 1540   Temp     Pulse 74 10/07/21 1539   Resp 12 10/07/21 1539   SpO2     Vitals shown include unvalidated device data.    Electronically Signed By: MELLY Hess CRNA  October 7, 2021  3:40 PM

## 2021-10-07 NOTE — PROGRESS NOTES
Chart assessment completed per CM protocol. Patient admitted from home after onset of sharp sudden pain while running, fell. Diagnosed with right hip fracture. Surgery consult today- plan for surgery later today 10/7/2021.     CM will follow up post op for discharge planning.     Care Management Follow Up    Length of Stay (days): 2        Patient plan of care discussed at interdisciplinary rounds: Yes new patient    Expected Discharge Date:   pending       Concerns to be Addressed / Barriers to Discharge: medical management of right hip fracture, surgery consult today (surgery this pm), NPO for surgery     Anticipated Discharge Disposition: pending

## 2021-10-07 NOTE — PLAN OF CARE
Pt is alert & pleasant & able to make needs known. She is very attuned to her fitness and overall health and well-being. Therefore she is reluctant to take narcotic pain medications because she prefers not to have those substances in her system. Pt did agree to take a muscle relaxer tonight at bedtime due to spasms in her buttock on the effected side. There continues to be no definitive plan for surgery at this time. Pt is to be NPO after midnight tonight in case they are able to do surgery tomorrow. Pt expressed frustration that she hadn't been provided with more information by the ortho doctors/surgeons in particular.

## 2021-10-07 NOTE — PROGRESS NOTES
"[facility] Medicine PROGRESS NOTE      Identification/Summary:      Viviana Bowen is a 56 year old female with a past medical history of no significant medical problems who was admitted on 10/5/2021 for fall, right hip dislocation, right femoral head fracture.     Assessment & Plan      Right hip dislocation  Right femoral head vertical fracture  Status post closed reduction  Now s/p CHITRA, POD 0  Pain control with the Tylenol, oxycodone p.o. as needed IV Dilaudid  PT/OT    Mild leukocytosis, stress response  Hyperchloremia       Diet: Advance Diet as Tolerated: Regular Diet Adult  DVT Prophylaxis: Aspirin per orthopedics  Lee Catheter: Not present  Central Lines: None    Code Status: Full Code    Discharge Planning   Anticipated Discharge in : Pending imaging, surgical plan likely 2 days  Milestones/Criteria For Discharge: See above      Interval History/Subjective:     Patient reports pain controlled at rest, now seen in PACU and no issues. Surgery went well.    Physical Exam/Objective:     Vitals I/O   Vital signs:  Temp: 98.9  F (37.2  C) Temp src: Temporal BP: 112/68 Pulse: 71   Resp: 22 SpO2: 95 % O2 Device: None (Room air) Oxygen Delivery: 6 LPM Height: 160 cm (5' 3\") Weight: 69.4 kg (152 lb 14.4 oz)  Estimated body mass index is 27.09 kg/m  as calculated from the following:    Height as of this encounter: 1.6 m (5' 3\").    Weight as of this encounter: 69.4 kg (152 lb 14.4 oz).       I/O last 3 completed shifts:  In: 480 [P.O.:480]  Out: 3350 [Urine:3350]     Vitals:    10/05/21 2141 10/06/21 1348   Weight: 56.7 kg (125 lb) 69.4 kg (152 lb 14.4 oz)      Weight change: 12.7 kg (27 lb 14.4 oz)   Body mass index is 27.09 kg/m .    General: Awake alert and comfortable  Lungs: CTA without rales or rhonchi  Heart: S1, S2 without murmurs  Abdomen: Soft nontender, bowel sounds positive  CNS: Nonfocal  Extremities: No edema. Surgical site clean.      Medications:     Medications     lactated ringers "       sodium chloride 100 mL/hr at 10/07/21 0945       [Auto Hold] acetaminophen  975 mg Oral Q8H     [Auto Hold] aspirin  81 mg Oral BID     [Auto Hold] polyethylene glycol  17 g Oral Daily     [Auto Hold] senna-docusate  1 tablet Oral BID     [Auto Hold] sodium chloride (PF)  3 mL Intracatheter Q8H       Data Reviewed   I personally reviewed all new medications, labs, imaging/diagnostics reports over the past 24 hours.     Pertinent findings include.     Labs    Recent Labs   Lab 10/07/21  0720 10/06/21  1138 10/05/21  2102   WBC  --  10.7 12.5*   HGB 10.7* 11.2* 12.0   MCV  --  93 89   PLT  --  148* 165   INR  --   --  1.04   NA  --  141 139   POTASSIUM  --  4.2 3.9   CHLORIDE  --  110* 108*   CO2  --  20* 22   BUN  --  11 18   CR  --  0.77 0.76   ANIONGAP  --  11 9   SATNAM  --  8.4* 8.8   GLC 95 125 134*   ALBUMIN  --  3.3*  --    PROTTOTAL  --  6.1  --    BILITOTAL  --  0.7  --    ALKPHOS  --  56  --    ALT  --  14  --    AST  --  19  --        Imaging   No results found for this or any previous visit (from the past 24 hour(s)).      EKG:      Aron Armstrong,   Internal Medicine / Hospital medicine   [facility]  Securely message with the Vocera Web Console (learn more here)  Text page via [employer] ([employer])

## 2021-10-08 ENCOUNTER — APPOINTMENT (OUTPATIENT)
Dept: PHYSICAL THERAPY | Facility: CLINIC | Age: 56
DRG: 522 | End: 2021-10-08
Payer: COMMERCIAL

## 2021-10-08 ENCOUNTER — APPOINTMENT (OUTPATIENT)
Dept: OCCUPATIONAL THERAPY | Facility: CLINIC | Age: 56
DRG: 522 | End: 2021-10-08
Payer: COMMERCIAL

## 2021-10-08 LAB
GLUCOSE BLD-MCNC: 116 MG/DL (ref 70–125)
HGB BLD-MCNC: 9.8 G/DL (ref 11.7–15.7)

## 2021-10-08 PROCEDURE — 36415 COLL VENOUS BLD VENIPUNCTURE: CPT | Performed by: INTERNAL MEDICINE

## 2021-10-08 PROCEDURE — 250N000011 HC RX IP 250 OP 636: Performed by: PHYSICIAN ASSISTANT

## 2021-10-08 PROCEDURE — 250N000013 HC RX MED GY IP 250 OP 250 PS 637: Performed by: PHYSICIAN ASSISTANT

## 2021-10-08 PROCEDURE — 97110 THERAPEUTIC EXERCISES: CPT | Mod: GP

## 2021-10-08 PROCEDURE — 99232 SBSQ HOSP IP/OBS MODERATE 35: CPT | Performed by: INTERNAL MEDICINE

## 2021-10-08 PROCEDURE — 82947 ASSAY GLUCOSE BLOOD QUANT: CPT | Performed by: INTERNAL MEDICINE

## 2021-10-08 PROCEDURE — 97116 GAIT TRAINING THERAPY: CPT | Mod: GP

## 2021-10-08 PROCEDURE — 250N000011 HC RX IP 250 OP 636: Performed by: ORTHOPAEDIC SURGERY

## 2021-10-08 PROCEDURE — 97535 SELF CARE MNGMENT TRAINING: CPT | Mod: GO

## 2021-10-08 PROCEDURE — 97162 PT EVAL MOD COMPLEX 30 MIN: CPT | Mod: GP

## 2021-10-08 PROCEDURE — 120N000001 HC R&B MED SURG/OB

## 2021-10-08 PROCEDURE — 97165 OT EVAL LOW COMPLEX 30 MIN: CPT | Mod: GO

## 2021-10-08 PROCEDURE — 85018 HEMOGLOBIN: CPT | Performed by: ORTHOPAEDIC SURGERY

## 2021-10-08 RX ADMIN — OXYCODONE HYDROCHLORIDE 10 MG: 5 TABLET ORAL at 08:18

## 2021-10-08 RX ADMIN — OXYCODONE HYDROCHLORIDE 5 MG: 5 TABLET ORAL at 12:15

## 2021-10-08 RX ADMIN — ONDANSETRON 4 MG: 4 TABLET, ORALLY DISINTEGRATING ORAL at 09:44

## 2021-10-08 RX ADMIN — CEFAZOLIN 1 G: 1 INJECTION, POWDER, FOR SOLUTION INTRAMUSCULAR; INTRAVENOUS at 05:07

## 2021-10-08 RX ADMIN — OXYCODONE HYDROCHLORIDE 5 MG: 5 TABLET ORAL at 02:00

## 2021-10-08 RX ADMIN — ACETAMINOPHEN 975 MG: 325 TABLET ORAL at 16:46

## 2021-10-08 RX ADMIN — OXYCODONE HYDROCHLORIDE 5 MG: 5 TABLET ORAL at 22:13

## 2021-10-08 RX ADMIN — OXYCODONE HYDROCHLORIDE 5 MG: 5 TABLET ORAL at 18:14

## 2021-10-08 RX ADMIN — POLYETHYLENE GLYCOL 3350 17 G: 17 POWDER, FOR SOLUTION ORAL at 08:18

## 2021-10-08 RX ADMIN — SENNOSIDES AND DOCUSATE SODIUM 1 TABLET: 50; 8.6 TABLET ORAL at 21:17

## 2021-10-08 RX ADMIN — SENNOSIDES AND DOCUSATE SODIUM 1 TABLET: 50; 8.6 TABLET ORAL at 08:18

## 2021-10-08 RX ADMIN — ACETAMINOPHEN 975 MG: 325 TABLET ORAL at 11:02

## 2021-10-08 RX ADMIN — ASPIRIN 81 MG: 81 TABLET, COATED ORAL at 08:18

## 2021-10-08 RX ADMIN — ACETAMINOPHEN 975 MG: 325 TABLET ORAL at 01:59

## 2021-10-08 RX ADMIN — ASPIRIN 81 MG: 81 TABLET, COATED ORAL at 21:17

## 2021-10-08 NOTE — PROGRESS NOTES
10/08/21 0830   Quick Adds   Type of Visit Initial PT Evaluation   Living Environment   People in home alone   Current Living Arrangements house   Home Accessibility stairs to enter home;stairs within home   Number of Stairs, Main Entrance 2   Stair Railings, Main Entrance none   Number of Stairs, Within Home, Primary none   Stair Railings, Within Home, Primary none   Self-Care   Usual Activity Tolerance excellent   Current Activity Tolerance moderate   Equipment Currently Used at Home none   General Information   Onset of Illness/Injury or Date of Surgery 10/05/21   Referring Physician Dr. Mynor Tellez   Patient/Family Therapy Goals Statement (PT) home   Pertinent History of Current Problem (include personal factors and/or comorbidities that impact the POC) s/p CHITRA following fracture and dislocation   Existing Precautions/Restrictions no hip IR;no hip ER;no active hip ABD;no hip ADD past midline;no hip hyperextension;90 degree hip flexion;no pivoting or twisting   Weight-Bearing Status - LLE full weight-bearing   Weight-Bearing Status - RLE weight-bearing as tolerated   Bed Mobility   Bed Mobility supine-sit   Supine-Sit Jay (Bed Mobility) contact guard;verbal cues   Comment (Bed Mobility) cueing for using UEs to assist R LE, cueing for precautions   Transfers   Transfers sit-stand transfer   Sit-Stand Transfer   Sit-Stand Jay (Transfers) verbal cues;contact guard   Assistive Device (Sit-Stand Transfers) walker, front-wheeled   Sit/Stand Transfer Comments CGA due to dizziness and nausea, cueing for UE and LE positioning, reviewed precautions   Gait/Stairs (Locomotion)   Jay Level (Gait) contact guard;verbal cues   Assistive Device (Gait) walker, front-wheeled   Distance in Feet (Required for LE Total Joints) 100   Pattern (Gait) step-through   Deviations/Abnormal Patterns (Gait) antalgic;gait speed decreased   Comment (Gait/Stairs) limited by nausea   Clinical Impression   Criteria  for Skilled Therapeutic Intervention yes, treatment indicated   PT Diagnosis (PT) impaired functional mobility   Influenced by the following impairments weakness, pain   Functional limitations due to impairments gait, stairs, transfers   Clinical Presentation Stable/Uncomplicated   Clinical Presentation Rationale pt presents as medically diagnosed   Clinical Decision Making (Complexity) moderate complexity   Therapy Frequency (PT) 2x/day   Predicted Duration of Therapy Intervention (days/wks) 1 week   Planned Therapy Interventions (PT) bed mobility training;gait training;home exercise program;patient/family education;stair training;strengthening;transfer training   Anticipated Equipment Needs at Discharge (PT) walker, rolling   Risk & Benefits of therapy have been explained care plan/treatment goals reviewed;patient   PT Discharge Planning    PT Discharge Recommendation (DC Rec) home with home care physical therapy   PT Rationale for DC Rec home with Home PT if patient able to have family/friends stay with her, TCU if unable to have support at home   PT Brief overview of current status  no hip IR;no hip ER;no active hip ABD;no hip ADD past midline;no hip hyperextension;90 degree hip flexion;no pivoting or twisting   Total Evaluation Time   Total Evaluation Time (Minutes) 7

## 2021-10-08 NOTE — PLAN OF CARE
Patient vital signs are at baseline: Yes  Patient able to ambulate as they were prior to admission or with assist devices provided by therapies during their stay:  No,  Reason:  pt needing assistance and not able to ambulate far due to nausea and weakness.   Patient MUST void prior to discharge:  Yes  Patient able to tolerate oral intake:  No,  Reason:  Pt feeling nauseated at times, zofran given with good relief, encouraged to increase intake when able.  Pain has adequate pain control using Oral analgesics:  Yes    Plan: anticipate discharge to home tomorrow.

## 2021-10-08 NOTE — PROGRESS NOTES
Care Management Follow Up    Length of Stay (days): 3    Expected Discharge Date: 10/09/2021     Concerns to be Addressed:       Patient plan of care discussed at interdisciplinary rounds: Yes    Anticipated Discharge Disposition:  home     Anticipated Discharge Services:  Home care for PT & OT  Anticipated Discharge DME:  Walker per PT    Patient/family educated on Medicare website which has current facility and service quality ratings:    Education Provided on the Discharge Plan:  yes  Patient/Family in Agreement with the Plan:  yes    Referrals Placed by CM/SW:  Home Care  Private pay costs discussed: Not applicable    Additional Information:  Met with pt to discuss discharge planning. She states she lives alone in a house and her ex-spouse, Esa will be able to provide assistance, along with friends.  Pt statesa she prefers to discharge home rather than TCU.  Discussed Home Care recommendation and pt is agreeable with PT & OT. She feels she can bathe independently or with family/friend assist. She does not have a preference with home care agencies. Home Care referrals sent.     3:41 PM  Received response from Sydney at [facility]. They are unable to accept pt.    Received call from [facility]. Spoke with Derian Guerrero.  He states they only accept [employer] Medicare plans. He referred writer to try referrals to [facility], [facility] and [facility].  Referrals sent to [facility] and [facility].  Call placed to Yarelis at [facility] ([phone]) and left message with home care referral and requested return call.    3:53 PM  Met with pt again to provide update on home care search. She confirms she has a friend who can stay with her 24/7 so would like to discharge home. She states she has worked with [facility] in the past and plans to seek OP PT if unable to obtain home care services.    Several home care referrals still pending.    Ashley Smith RN

## 2021-10-08 NOTE — PLAN OF CARE
"Pt is alert & pleasant & able to make needs known. She has had good pain control with her block thus far. She has recently begun to feel more pain as it wears off as would be expected. Pt was able to ambulate to the restroom with 1 assist & gait belt & walker. Pt's CMS is intact, good dorsi/plantar flexion in the effected ankle. Pt is allowed to bear weight on the effected leg. However she has restrictions on twisting and adduction and has a wedge pillow to prevent this movement. Pt was reminded of these restrictions, yet requested to have the wedge removed. We compromised by placing a regular pillow between her legs as a \"reminder\" not to cross them.    "

## 2021-10-08 NOTE — PROGRESS NOTES
"[facility] Medicine PROGRESS NOTE      Identification/Summary:      Viviana Bowen is a 56 year old female with a past medical history of no significant medical problems who was admitted on 10/5/2021 for fall, right hip dislocation, right femoral head fracture.       10/8 :       Post op day 1  Pain controlled  No other medical issues noted today    Plan to discharge to home versus TCU sat or Sunday  Care management team following      Assessment & Plan      Right hip dislocation  Right femoral head vertical fracture  Status post closed reduction  Now s/p CHITRA, POD 0  Pain control with the Tylenol, oxycodone p.o. as needed IV Dilaudid  PT/OT    Mild leukocytosis, stress response  Hyperchloremia       Diet: Advance Diet as Tolerated: Regular Diet Adult  DVT Prophylaxis: Aspirin per orthopedics  Lee Catheter: Not present  Central Lines: None    Code Status: Full Code    Discharge Planning   Anticipated Discharge in : Pending imaging, surgical plan likely 2 days  Milestones/Criteria For Discharge: See above          Physical Exam/Objective:     Vitals I/O   Vital signs:  Temp: 98.2  F (36.8  C) Temp src: Oral BP: 113/54 Pulse: 80   Resp: 18 SpO2: 95 % O2 Device: None (Room air) Oxygen Delivery: 6 LPM Height: 160 cm (5' 3\") Weight: 69.4 kg (152 lb 14.4 oz)  Estimated body mass index is 27.09 kg/m  as calculated from the following:    Height as of this encounter: 1.6 m (5' 3\").    Weight as of this encounter: 69.4 kg (152 lb 14.4 oz).       I/O last 3 completed shifts:  In: 4206 [P.O.:500; I.V.:3706]  Out: 3825 [Urine:3525; Blood:300]     Vitals:    10/05/21 2141 10/06/21 1348   Weight: 56.7 kg (125 lb) 69.4 kg (152 lb 14.4 oz)      Weight change:    Body mass index is 27.09 kg/m .    General: Awake alert and comfortable  Lungs: CTA without rales or rhonchi  Heart: S1, S2 without murmurs  Abdomen: Soft nontender, bowel sounds positive  CNS: Nonfocal  Extremities: No edema. Surgical site " clean.      Medications:     Medications     lactated ringers 75 mL/hr at 10/07/21 1853       aspirin  81 mg Oral BID     polyethylene glycol  17 g Oral Daily     senna-docusate  1 tablet Oral BID     sodium chloride (PF)  3 mL Intracatheter Q8H       Data Reviewed   I personally reviewed all new medications, labs, imaging/diagnostics reports over the past 24 hours.     Pertinent findings include.     Labs    Recent Labs   Lab 10/08/21  0556 10/07/21  0720 10/06/21  1138 10/05/21  2102 10/05/21  2102   WBC  --   --  10.7  --  12.5*   HGB 9.8* 10.7* 11.2*   < > 12.0   MCV  --   --  93  --  89   PLT  --   --  148*  --  165   INR  --   --   --   --  1.04   NA  --   --  141  --  139   POTASSIUM  --   --  4.2  --  3.9   CHLORIDE  --   --  110*  --  108*   CO2  --   --  20*  --  22   BUN  --   --  11  --  18   CR  --   --  0.77  --  0.76   ANIONGAP  --   --  11  --  9   SATNAM  --   --  8.4*  --  8.8    95 125   < > 134*   ALBUMIN  --   --  3.3*  --   --    PROTTOTAL  --   --  6.1  --   --    BILITOTAL  --   --  0.7  --   --    ALKPHOS  --   --  56  --   --    ALT  --   --  14  --   --    AST  --   --  19  --   --     < > = values in this interval not displayed.       Imaging   No results found for this or any previous visit (from the past 24 hour(s)).      EKG:      Adam Dsouza MD  Internal Medicine / [facility]  Securely message with the Vocera Web Console (learn more here)  Text page via [employer] ([employer])

## 2021-10-08 NOTE — PROGRESS NOTES
Occupational Therapy       10/08/21 1045   Quick Adds   Type of Visit Initial Occupational Therapy Evaluation   Living Environment   Living Environment Comments see PT note   Self-Care   Activity/Exercise/Self-Care Comment see PT Note   General Information   Onset of Illness/Injury or Date of Surgery 10/05/21   Referring Physician Geoff   Patient/Family Therapy Goal Statement (OT) Get back to running   Existing Precautions/Restrictions no hip IR;no hip ER;no active hip ABD;no hip ADD past midline;no hip hyperextension;90 degree hip flexion;no pivoting or twisting   Left Upper Extremity (Weight-bearing Status) full weight-bearing (FWB)   Cognitive Status Examination   Orientation Status orientation to person, place and time   Bed Mobility   Bed Mobility sit-supine   Sit-Supine Co[place] (Bed Mobility) moderate assist (50% patient effort)   Transfers   Transfers toilet transfer;sit-stand transfer   Sit-Stand Transfer   Sit-Stand [place] (Transfers) contact guard   Toilet Transfer   Coosa Level (Toilet Transfer) contact guard   Clinical Impression   Criteria for Skilled Therapeutic Interventions Met (OT) yes;meets criteria;skilled treatment is necessary   OT Diagnosis decreased ADL independence   OT Problem List-Impairments impacting ADL problems related to;post-surgical precautions   Assessment of Occupational Performance 1-3 Performance Deficits   Identified Performance Deficits dressing, toileting, bed mob, trsfs   Planned Therapy Interventions (OT) ADL retraining;balance training;bed mobility training;transfer training;home program guidelines;progressive activity/exercise   Clinical Decision Making Complexity (OT) low complexity   Therapy Frequency (OT) 2x/day   Predicted Duration of Therapy 7 days   Risk & Benefits of therapy have been explained evaluation/treatment results reviewed   OT Discharge Planning    OT Discharge Recommendation (DC Rec) Home with assist   OT Rationale for DC Rec If patient  unable to have family stay with her, patient will require TCU at discharge.   Total Evaluation Time (Minutes)   Total Evaluation Time (Minutes) 15

## 2021-10-08 NOTE — PLAN OF CARE
"  Problem: Adult Inpatient Plan of Care  Goal: Optimal Comfort and Wellbeing  Outcome: Improving     Problem: Adult Inpatient Plan of Care  Goal: Readiness for Transition of Care  Outcome: Improving     /65 (BP Location: Left arm)   Pulse 81   Temp 97.6  F (36.4  C) (Oral)   Resp 17   Ht 1.6 m (5' 3\")   Wt 69.4 kg (152 lb 14.4 oz)   SpO2 97%   BMI 27.09 kg/m      Patient vital signs are at baseline: Yes  Patient able to ambulate as they were prior to admission or with assist devices provided by therapies during their stay:  Yes  Patient MUST void prior to discharge:  Yes  Patient able to tolerate oral intake:  Yes  Pain has adequate pain control using Oral analgesics:  Yes    /65 (BP Location: Left arm)   Pulse 81   Temp 97.6  F (36.4  C) (Oral)   Resp 17   Ht 1.6 m (5' 3\")   Wt 69.4 kg (152 lb 14.4 oz)   SpO2 97%   BMI 27.09 kg/m      Pt AOx4. Up with 1 assist and walker. PRN oxycodone given.    Jamie Tolliver RN    "

## 2021-10-08 NOTE — PROGRESS NOTES
"Orthopedic Progress Note      Assessment: 1 Day Post-Op  S/P Procedure(s):  ARTHROPLASTY, HIP, TOTAL     Plan:   - Continue PT/OT  - Continue pain management prn, continue antinausea meds prn  - Weightbearing status: WBAT, posterior hip precautions   - Anticoagulation: ASA 81 PO BID in addition to SCDs, mercy stockings and early ambulation.  - Patient will be discharged with Cefadroxil x 7 days. (already escribed by surgical team, if she goes to TCU please adjust to make sure this is taken care of)     - Discharge planning: likely tomorrow. Pending progression with therapy and discharge planning. Per therapy notes this morning, patient is figuring out if someone can stay with her at home then she will be cleared for home. Otherwise, therapy recommends TCU.     Subjective:  Pain: moderate  Nausea, Vomiting:  Yes  Lightheadedness, Dizziness:  No  Neuro:  Patient denies new onset numbness or paresthesias    Patient reports pain is better than before surgery, is tolerable at rest, but increases with movement. She states she has been up walking some, but has not had much therapy yet. She states this morning she had to stop therapy early due to nausea and almost vomiting.     Objective:  /59 (BP Location: Left arm)   Pulse 77   Temp 98.3  F (36.8  C) (Oral)   Resp 16   Ht 1.6 m (5' 3\")   Wt 69.4 kg (152 lb 14.4 oz)   SpO2 98%   BMI 27.09 kg/m    The patient is A&Ox3. Appears somewhat comfortable sitting in recliner.   Sensation is intact.  Dorsiflexion and plantar flexion is intact.  Dorsalis pedis pulse intact.  Calves are soft and non-tender. Negative Radu's.  The incision is covered. Dressing C/D/I.    Pertinent Labs   Lab Results: personally reviewed.   Lab Results   Component Value Date    INR 1.04 10/05/2021     Lab Results   Component Value Date    WBC 10.7 10/06/2021    HGB 9.8 (L) 10/08/2021    HCT 33.7 (L) 10/06/2021    MCV 93 10/06/2021     (L) 10/06/2021     Lab Results   Component Value " Date     10/06/2021    CO2 20 (L) 10/06/2021         Report completed by:  Zara Aiken PA-C, ALY  Date: 10/8/2021  Time: 10:15 AM

## 2021-10-09 ENCOUNTER — APPOINTMENT (OUTPATIENT)
Dept: OCCUPATIONAL THERAPY | Facility: CLINIC | Age: 56
DRG: 522 | End: 2021-10-09
Payer: COMMERCIAL

## 2021-10-09 ENCOUNTER — APPOINTMENT (OUTPATIENT)
Dept: PHYSICAL THERAPY | Facility: CLINIC | Age: 56
DRG: 522 | End: 2021-10-09
Payer: COMMERCIAL

## 2021-10-09 LAB
GLUCOSE BLD-MCNC: 136 MG/DL (ref 70–125)
HGB BLD-MCNC: 9.4 G/DL (ref 11.7–15.7)

## 2021-10-09 PROCEDURE — 97116 GAIT TRAINING THERAPY: CPT | Mod: GP

## 2021-10-09 PROCEDURE — 120N000001 HC R&B MED SURG/OB

## 2021-10-09 PROCEDURE — 258N000003 HC RX IP 258 OP 636: Performed by: ORTHOPAEDIC SURGERY

## 2021-10-09 PROCEDURE — 82947 ASSAY GLUCOSE BLOOD QUANT: CPT | Performed by: ORTHOPAEDIC SURGERY

## 2021-10-09 PROCEDURE — 250N000013 HC RX MED GY IP 250 OP 250 PS 637: Performed by: PHYSICIAN ASSISTANT

## 2021-10-09 PROCEDURE — 97110 THERAPEUTIC EXERCISES: CPT | Mod: GP

## 2021-10-09 PROCEDURE — 36415 COLL VENOUS BLD VENIPUNCTURE: CPT | Performed by: ORTHOPAEDIC SURGERY

## 2021-10-09 PROCEDURE — 99233 SBSQ HOSP IP/OBS HIGH 50: CPT | Performed by: INTERNAL MEDICINE

## 2021-10-09 PROCEDURE — 250N000011 HC RX IP 250 OP 636: Performed by: PHYSICIAN ASSISTANT

## 2021-10-09 PROCEDURE — 97535 SELF CARE MNGMENT TRAINING: CPT | Mod: GO

## 2021-10-09 PROCEDURE — 85018 HEMOGLOBIN: CPT | Performed by: ORTHOPAEDIC SURGERY

## 2021-10-09 RX ADMIN — CYCLOBENZAPRINE HYDROCHLORIDE 10 MG: 10 TABLET, FILM COATED ORAL at 01:14

## 2021-10-09 RX ADMIN — SODIUM CHLORIDE, POTASSIUM CHLORIDE, SODIUM LACTATE AND CALCIUM CHLORIDE: 600; 310; 30; 20 INJECTION, SOLUTION INTRAVENOUS at 10:44

## 2021-10-09 RX ADMIN — ONDANSETRON 4 MG: 4 TABLET, ORALLY DISINTEGRATING ORAL at 06:36

## 2021-10-09 RX ADMIN — SENNOSIDES AND DOCUSATE SODIUM 1 TABLET: 50; 8.6 TABLET ORAL at 08:51

## 2021-10-09 RX ADMIN — ACETAMINOPHEN 650 MG: 325 TABLET ORAL at 17:20

## 2021-10-09 RX ADMIN — CYCLOBENZAPRINE HYDROCHLORIDE 10 MG: 10 TABLET, FILM COATED ORAL at 17:20

## 2021-10-09 RX ADMIN — ASPIRIN 81 MG: 81 TABLET, COATED ORAL at 19:58

## 2021-10-09 RX ADMIN — POLYETHYLENE GLYCOL 3350 17 G: 17 POWDER, FOR SOLUTION ORAL at 08:51

## 2021-10-09 RX ADMIN — OXYCODONE HYDROCHLORIDE 5 MG: 5 TABLET ORAL at 18:26

## 2021-10-09 RX ADMIN — OXYCODONE HYDROCHLORIDE 10 MG: 5 TABLET ORAL at 14:28

## 2021-10-09 RX ADMIN — SENNOSIDES AND DOCUSATE SODIUM 1 TABLET: 50; 8.6 TABLET ORAL at 19:59

## 2021-10-09 RX ADMIN — OXYCODONE HYDROCHLORIDE 5 MG: 5 TABLET ORAL at 10:43

## 2021-10-09 RX ADMIN — OXYCODONE HYDROCHLORIDE 10 MG: 5 TABLET ORAL at 06:36

## 2021-10-09 RX ADMIN — ASPIRIN 81 MG: 81 TABLET, COATED ORAL at 08:51

## 2021-10-09 NOTE — PLAN OF CARE
Physical Therapy Discharge Summary    Reason for therapy discharge:    All goals and outcomes met, no further needs identified.    Progress towards therapy goal(s). See goals on Care Plan in [facility] electronic health record for goal details.  Goals met    Therapy recommendation(s):    Continued therapy is recommended.  Rationale/Recommendations:  Home Physical Therapy.  Continue home exercise program.

## 2021-10-09 NOTE — PLAN OF CARE
Problem: Pain Acute  Goal: Acceptable Pain Control and Functional Ability  Outcome: Improving  Intervention: Develop Pain Management Plan  Recent Flowsheet Documentation  Taken 10/9/2021 1043 by Giorgio Verma RN  Pain Management Interventions: medication (see MAR)     Problem: Postoperative Nausea and Vomiting (Hip Arthroplasty)  Goal: Nausea and Vomiting Relief  Outcome: Improving   Pt using oxycodone for pain management and doing better this shift.  Experienced dizziness and nausea this morning, zofran given and restarted IVF's per hospitalist.  Pt states does not have an appetite but is trying to eat small amounts.  Up with one and walker, met goals with therapy today.      Plan: anticipate probable discharge to home later today if feeling better.

## 2021-10-09 NOTE — PROGRESS NOTES
"[facility] Medicine PROGRESS NOTE      Identification/Summary:      Viviana Bowen is a 56 year old female with a past medical history of no significant medical problems who was admitted on 10/5/2021 for fall, right hip dislocation, right femoral head fracture.       10/9 :       Post op day 2  Pain controlled but had soft BP, dizziness, nausea this morning  Restarted iv fluids  No other medical issues noted today    Plan to discharge to home with home health on Sunday if symptomatically better and BP stable.        Assessment & Plan      Right hip dislocation  Right femoral head vertical fracture  Status post closed reduction  Now s/p CHITRA, POD 0  Pain control with the Tylenol, oxycodone p.o. as needed IV Dilaudid  PT/OT    Mild leukocytosis, stress response  Hyperchloremia       Diet: Advance Diet as Tolerated: Regular Diet Adult  DVT Prophylaxis: Aspirin per orthopedics  Lee Catheter: Not present  Central Lines: None    Code Status: Full Code    Discharge Planning   Anticipated Discharge in : Pending imaging, surgical plan likely 2 days  Milestones/Criteria For Discharge: See above          Physical Exam/Objective:     Vitals I/O   Vital signs:  Temp: 98.6  F (37  C) Temp src: Oral BP: 106/61 Pulse: 73   Resp: 18 SpO2: 98 % O2 Device: None (Room air) Oxygen Delivery: 6 LPM Height: 160 cm (5' 3\") Weight: 69.4 kg (152 lb 14.4 oz)  Estimated body mass index is 27.09 kg/m  as calculated from the following:    Height as of this encounter: 1.6 m (5' 3\").    Weight as of this encounter: 69.4 kg (152 lb 14.4 oz).       I/O last 3 completed shifts:  In: 440 [P.O.:440]  Out: 1200 [Urine:1200]     Vitals:    10/05/21 2141 10/06/21 1348   Weight: 56.7 kg (125 lb) 69.4 kg (152 lb 14.4 oz)      Weight change:    Body mass index is 27.09 kg/m .    General: Awake alert and comfortable  Lungs: CTA without rales or rhonchi  Heart: S1, S2 without murmurs  Abdomen: Soft nontender, bowel sounds positive  CNS: " Nonfocal  Extremities: No edema. Surgical site clean.      Medications:     Medications     lactated ringers 75 mL/hr at 10/09/21 1044       aspirin  81 mg Oral BID     polyethylene glycol  17 g Oral Daily     senna-docusate  1 tablet Oral BID     sodium chloride (PF)  3 mL Intracatheter Q8H       Data Reviewed   I personally reviewed all new medications, labs, imaging/diagnostics reports over the past 24 hours.     Pertinent findings include.     Labs    Recent Labs   Lab 10/09/21  0808 10/08/21  0556 10/07/21  0720 10/06/21  1138 10/06/21  1138 10/05/21  2102 10/05/21  2102   WBC  --   --   --   --  10.7  --  12.5*   HGB 9.4* 9.8* 10.7*   < > 11.2*   < > 12.0   MCV  --   --   --   --  93  --  89   PLT  --   --   --   --  148*  --  165   INR  --   --   --   --   --   --  1.04   NA  --   --   --   --  141  --  139   POTASSIUM  --   --   --   --  4.2  --  3.9   CHLORIDE  --   --   --   --  110*  --  108*   CO2  --   --   --   --  20*  --  22   BUN  --   --   --   --  11  --  18   CR  --   --   --   --  0.77  --  0.76   ANIONGAP  --   --   --   --  11  --  9   SATNAM  --   --   --   --  8.4*  --  8.8   * 116 95   < > 125   < > 134*   ALBUMIN  --   --   --   --  3.3*  --   --    PROTTOTAL  --   --   --   --  6.1  --   --    BILITOTAL  --   --   --   --  0.7  --   --    ALKPHOS  --   --   --   --  56  --   --    ALT  --   --   --   --  14  --   --    AST  --   --   --   --  19  --   --     < > = values in this interval not displayed.       Imaging   No results found for this or any previous visit (from the past 24 hour(s)).      EKG:      Adam Dsouza MD  Internal Medicine / [facility]  Securely message with the [employer] Web Console (learn more here)  Text page via [employer] ([employer])

## 2021-10-09 NOTE — PLAN OF CARE
Problem: Pain Acute  Goal: Acceptable Pain Control and Functional Ability  Outcome: Improving  Intervention: Prevent or Manage Pain  Recent Flowsheet Documentation  Taken 10/8/2021 2100 by Sarah Chandler RN  Medication Review/Management: medications reviewed   Pt is alert and oriented, voiding and using the call light appropriately.  Pain is being managed by tylenol and oxycodone.  We will continue to assess and monitor.  Patient vital signs are at baseline: Yes  Patient able to ambulate as they were prior to admission or with assist devices provided by therapies during their stay:  Yes  Patient MUST void prior to discharge:  Yes  Patient able to tolerate oral intake:  Yes  Pain has adequate pain control using Oral analgesics:  Yes

## 2021-10-09 NOTE — PLAN OF CARE
Occupational Therapy Discharge Summary    Reason for therapy discharge:    All goals and outcomes met, no further needs identified.    Progress towards therapy goal(s). See goals on Care Plan in [facility] electronic health record for goal details.  Goals met    Therapy recommendation(s):    No further therapy is recommended.

## 2021-10-09 NOTE — PLAN OF CARE
Patient vital signs are at baseline: Yes  Patient able to ambulate as they were prior to admission or with assist devices provided by therapies during their stay:  Yes  Patient MUST void prior to discharge:  Yes  Patient able to tolerate oral intake:  Yes  Pain has adequate pain control using Oral analgesics:  Yes     Pt is doing well post op. Using PRN oxy and flexeril for pain which she finds helpful. Plan is for pt to discharge home today with home or outpt PT.     0630 - Pt reporting dizziness and nausea when getting up to the bathroom this morning. Endorses a high level of hip pain 7/10, prn oxy 10mg given along with oral zofran. Pending relief.

## 2021-10-09 NOTE — PROGRESS NOTES
ORTHOPEDIC L/E PROGRESS NOTE      ASSESSMENT  POD # 2 s/p  Right CHITRA  PLAN  Continue Teds/SCDs  Continue with PT, OT.  Discharge planning for home later today with possible home PT    Subjective:  Procedure(s):  ARTHROPLASTY, HIP, TOTAL RIGHT on 10/5/2021 - 10/7/2021.   2 Days Post-Op    Pain: mild, much improved     Vital signs in last 24 hours  Temp:  [97.8  F (36.6  C)-98.5  F (36.9  C)] 97.8  F (36.6  C)  Pulse:  [72-80] 72  Resp:  [18] 18  BP: ()/(54-61) 98/58  SpO2:  [93 %-95 %] 93 %    EXAM   The patient is awake and alert  Calves are soft and non-tender.   Sensation is intact.  Dorsiflexion and plantar flexion is intact.  The dressing C/D/I      Phil Bañuelos MD, MD  [facility]  Date: 10/9/2021  Time: 9:23 AM

## 2021-10-09 NOTE — DISCHARGE INSTRUCTIONS
Home care services have been arranged for you.  Agency:  [facility]  Services: PT and OT  Phone: [phone]  Instructions: Home Care will contact you within 24 hours to arrange the first visit.

## 2021-10-09 NOTE — PROGRESS NOTES
Care Management Follow Up    Length of Stay (days): 4    Expected Discharge Date: 10/09/2021     Concerns to be Addressed:       Patient plan of care discussed at interdisciplinary rounds: Yes    Anticipated Discharge Disposition:       Anticipated Discharge Services:    Anticipated Discharge DME:      Patient/family educated on Medicare website which has current facility and service quality ratings:    Education Provided on the Discharge Plan:    Patient/Family in Agreement with the Plan:      Referrals Placed by CM/SW:    Private pay costs discussed: Not applicable    Additional Information  Home Care referral accepted by [facility]  Will need to fax discharge orders at time of discharge.      MAGALY Blue

## 2021-10-10 VITALS
WEIGHT: 152.9 LBS | DIASTOLIC BLOOD PRESSURE: 70 MMHG | SYSTOLIC BLOOD PRESSURE: 115 MMHG | OXYGEN SATURATION: 97 % | HEIGHT: 63 IN | HEART RATE: 83 BPM | BODY MASS INDEX: 27.09 KG/M2 | RESPIRATION RATE: 18 BRPM | TEMPERATURE: 97.4 F

## 2021-10-10 LAB
ANION GAP SERPL CALCULATED.3IONS-SCNC: 10 MMOL/L (ref 5–18)
BUN SERPL-MCNC: 5 MG/DL (ref 8–22)
CALCIUM SERPL-MCNC: 8.8 MG/DL (ref 8.5–10.5)
CHLORIDE BLD-SCNC: 105 MMOL/L (ref 98–107)
CO2 SERPL-SCNC: 26 MMOL/L (ref 22–31)
CREAT SERPL-MCNC: 0.7 MG/DL (ref 0.6–1.1)
GFR SERPL CREATININE-BSD FRML MDRD: >90 ML/MIN/1.73M2
GLUCOSE BLD-MCNC: 119 MG/DL (ref 70–125)
POTASSIUM BLD-SCNC: 3.9 MMOL/L (ref 3.5–5)
SODIUM SERPL-SCNC: 141 MMOL/L (ref 136–145)

## 2021-10-10 PROCEDURE — 250N000013 HC RX MED GY IP 250 OP 250 PS 637: Performed by: PHYSICIAN ASSISTANT

## 2021-10-10 PROCEDURE — 80048 BASIC METABOLIC PNL TOTAL CA: CPT | Performed by: INTERNAL MEDICINE

## 2021-10-10 PROCEDURE — 258N000003 HC RX IP 258 OP 636: Performed by: ORTHOPAEDIC SURGERY

## 2021-10-10 PROCEDURE — 99239 HOSP IP/OBS DSCHRG MGMT >30: CPT | Performed by: INTERNAL MEDICINE

## 2021-10-10 PROCEDURE — 250N000013 HC RX MED GY IP 250 OP 250 PS 637: Performed by: INTERNAL MEDICINE

## 2021-10-10 PROCEDURE — 36415 COLL VENOUS BLD VENIPUNCTURE: CPT | Performed by: INTERNAL MEDICINE

## 2021-10-10 RX ORDER — AMOXICILLIN 250 MG
1 CAPSULE ORAL 2 TIMES DAILY
Qty: 30 TABLET | Refills: 0 | Status: SHIPPED | OUTPATIENT
Start: 2021-10-10

## 2021-10-10 RX ORDER — OXYCODONE HYDROCHLORIDE 5 MG/1
5 TABLET ORAL EVERY 4 HOURS PRN
Qty: 20 TABLET | Refills: 0 | Status: SHIPPED | OUTPATIENT
Start: 2021-10-10

## 2021-10-10 RX ORDER — CALCIUM CARBONATE 500 MG/1
500 TABLET, CHEWABLE ORAL DAILY PRN
Status: DISCONTINUED | OUTPATIENT
Start: 2021-10-10 | End: 2021-10-10 | Stop reason: HOSPADM

## 2021-10-10 RX ADMIN — CALCIUM CARBONATE (ANTACID) CHEW TAB 500 MG 500 MG: 500 CHEW TAB at 09:14

## 2021-10-10 RX ADMIN — SENNOSIDES AND DOCUSATE SODIUM 1 TABLET: 50; 8.6 TABLET ORAL at 09:17

## 2021-10-10 RX ADMIN — SODIUM CHLORIDE, POTASSIUM CHLORIDE, SODIUM LACTATE AND CALCIUM CHLORIDE: 600; 310; 30; 20 INJECTION, SOLUTION INTRAVENOUS at 00:08

## 2021-10-10 RX ADMIN — ASPIRIN 81 MG: 81 TABLET, COATED ORAL at 09:18

## 2021-10-10 RX ADMIN — POLYETHYLENE GLYCOL 3350 17 G: 17 POWDER, FOR SOLUTION ORAL at 09:18

## 2021-10-10 NOTE — PLAN OF CARE
Patient vital signs are at baseline: Yes  Patient able to ambulate as they were prior to admission or with assist devices provided by therapies during their stay:  Yes  Patient MUST void prior to discharge:  Yes  Patient able to tolerate oral intake:  Yes  Pain has adequate pain control using Oral analgesics:  Yes    Patient had some heartburn this evening that improved with Tums.     Patient given and explained discharge instructions, verbalized understanding.     Estella Flannery RN 10/10/2021

## 2021-10-10 NOTE — PROGRESS NOTES
"Ortho Progress Note      Post-operative Day: 3 Days Post-Op  S/P Procedure(s):  ARTHROPLASTY, HIP, TOTAL RIGHT      Assessment: 3 days s/p R CHITRA    Plan:   Continue PT/OT  Weightbearing status:WBAT  Anticoagulation: ASA 81 PO BID in addition to SCDs, mercy stockings and early ambulation.  Discharge planning: Home with outpatient therapy if medically cleared today      Subjective:  Pain: mild  Chest pain, SOB: She reports central burning chest pain since yesterday evening that she attributes to heartburn. No other pain. No shortness of breath.  Nausea, vomiting:  NO  Lightheadedness, dizziness:  NO - she reports no lightheadedness this AM  Neuro:  Patient denies new onset numbness or paresthesias    Objective:  /70 (BP Location: Right arm)   Pulse 83   Temp 97.4  F (36.3  C) (Oral)   Resp 18   Ht 1.6 m (5' 3\")   Wt 69.4 kg (152 lb 14.4 oz)   SpO2 97%   BMI 27.09 kg/m    Gen: A&O x 3. NAD. Appears comfortable seated on the bed.  Wound status: clean, dry and intact mepilex  Circulation, motion and sensation: Dorsiflexion/plantarflexion intact and equal bilaterally; distal lower extremity sensation is intact and equal bilaterally.  Swelling: mild  Calf tenderness: calves are soft and non-tender bilaterally.    Pertinent Labs   Lab Results: personally reviewed.   Lab Results   Component Value Date    INR 1.04 10/05/2021     Lab Results   Component Value Date    WBC 10.7 10/06/2021    HGB 9.4 (L) 10/09/2021    HCT 33.7 (L) 10/06/2021    MCV 93 10/06/2021     (L) 10/06/2021     Lab Results   Component Value Date     10/06/2021    CO2 20 (L) 10/06/2021         Report completed by:  Carol Ramirez PA-C  Date: 10/10/2021  Time: 9:22 AM    "

## 2021-10-10 NOTE — DISCHARGE SUMMARY
[facility] MEDICINE  DISCHARGE SUMMARY     Primary Care Physician: System, Provider Not In  Admission Date: 10/5/2021   Discharge Provider: Essie Syed MD Discharge Date: 10/10/2021   Diet: Orders Placed This Encounter      Advance Diet as Tolerated: Regular Diet Adult      Diet      Discharge Instruction - Regular Diet Adult      Code Status: Full Code   Activity: activity as tolerated   [facility]      Condition at Discharge: Stable      REASON FOR PRESENTATION(See Admission Note for Details)   Right hip pain    PRINCIPAL & ACTIVE DISCHARGE DIAGNOSES     Principal diagnosis:   right hip fracture, dislocation s/p closed reduction, s/p CHITRA  Mechanical fall     SIGNIFICANT FINDINGS (Imaging, labs):   Xray hip   IMPRESSION: Unusual fracture extending somewhat vertically through the femoral head with a crescent-shaped medial fracture fragment of the femoral head remaining articulating with the acetabulum. Remainder of the femur is proximally subluxed and lies adjacent to the superolateral margin of the acetabulum.     CT right hip   IMPRESSION:   Comminuted intra-articular fracture of the right femoral head. The largest articular surface containing fracture fragment is dislocated posteriorly and superiorly from the acetabulum and is rotated. The other main articular surface containing fracture fragment is not dislocated.    PENDING LABS       PROCEDURES ( this hospitalization only)      Procedure(s):  ARTHROPLASTY, HIP, TOTAL RIGHT    RECOMMENDATION FOR F/U VISIT       DISPOSITION     Home    SUMMARY OF HOSPITAL COURSE:    56 year old female with a past medical history of no significant medical problems who was admitted on 10/5/2021 for mechanical fall, right hip dislocation, right femoral head fracture.  She was seen by orthopedic surgeon. She had closed reduction of the dislocation.  Subsequently had right total hip arthroplasty.  Postoperatively her pain was controlled on  oxycodone.  She is taking aspirin for DVT prevention.  She is discharged home with home care.     Discharge Medications with Med changes:        Review of your medicines      START taking      Dose / Directions   aspirin 81 MG EC tablet  Commonly known as: ASA  Indication: VTE Prophylaxis      Dose: 81 mg  Take 1 tablet (81 mg) by mouth 2 times daily  Quantity: 60 tablet  Refills: 0     cefadroxil 500 MG capsule  Commonly known as: DURICEF  Notes to patient: 10/10/2021 bedtime       Dose: 500 mg  Take 1 capsule (500 mg) by mouth 2 times daily  Quantity: 14 capsule  Refills: 0     oxyCODONE 5 MG tablet  Commonly known as: ROXICODONE      Dose: 5 mg  Take 1 tablet (5 mg) by mouth every 4 hours as needed for moderate to severe pain  Quantity: 20 tablet  Refills: 0     senna-docusate 8.6-50 MG tablet  Commonly known as: SENOKOT-S/PERICOLACE      Dose: 1 tablet  Take 1 tablet by mouth 2 times daily  Quantity: 30 tablet  Refills: 0        CONTINUE these medicines which have NOT CHANGED      Dose / Directions   multivitamin, therapeutic Tabs tablet      Dose: 1 tablet  Take 1 tablet by mouth At Bedtime  Refills: 0           Where to get your medicines      These medications were sent to [id number] [place] [address] [address] [facility]  [address] [facility] [id number]    Hours: 24-hours Phone: [phone]     cefadroxil 500 MG capsule    oxyCODONE 5 MG tablet    senna-docusate 8.6-50 MG tablet     Some of these will need a paper prescription and others can be bought over the counter. Ask your nurse if you have questions.    You don't need a prescription for these medications    aspirin 81 MG EC tablet              Rationale for medication changes:    Oxycodone for pain  cefadroxil for infection prevention          Consults   orthopedics      Immunizations given this encounter       Discharge Procedure Orders   Reason for your hospital stay   Order Comments: Right femoral  fracture s/p repair     Follow-up and recommended labs and tests   Order Comments: Follow up with primary care provider, Provider Not In System, within 7 days for hospital follow- up.   Follow up with orthopedic surgery in 1-2 weeks     Activity   Order Comments: Your activity upon discharge: activity as tolerated     Order Specific Question Answer Comments   Is discharge order? Yes      MD face to face encounter   Order Comments: Documentation of Face to Face and Certification for Home Health Services    I certify that patient: Viviana Bowen is under my care and that I, or a nurse practitioner or physician's assistant working with me, had a face-to-face encounter that meets the physician face-to-face encounter requirements with this patient on: 10/10/2021.    This encounter with the patient was in whole, or in part, for the following medical condition, which is the primary reason for home health care: Right femoral fracture status post repair    I certify that, based on my findings, the following services are medically necessary home health services: Occupational Therapy and Physical Therapy.    My clinical findings support the need for the above services because: Occupational Therapy Services are needed to assess and treat cognitive ability and address ADL safety due to impairment in mobility. and Physical Therapy Services are needed to assess and treat the following functional impairments: strengthening, Range of motion    Further, I certify that my clinical findings support that this patient is homebound (i.e. absences from home require considerable and taxing effort and are for medical reasons or [employer] services or infrequently or of short duration when for other reasons) because: Requires assistance of another person or specialized equipment to access medical services because patient: recent surgery.    Based on the above findings. I certify that this patient is confined to the home and needs  "intermittent skilled nursing care, physical therapy and/or speech therapy.  The patient is under my care, and I have initiated the establishment of the plan of care.  This patient will be followed by a physician who will periodically review the plan of care.  Physician/Provider to provide follow up care: System, Provider Not In    Attending hospital physician (the Medicare certified OLIVER provider): Essie Syed MD  Physician Signature: See electronic signature associated with these discharge orders.  Date: 10/10/2021     When to call - Contact Surgeon Team   Order Comments: You may experience symptoms that require follow-up before your scheduled appointment. Refer to the \"[employer]\" for instructions on when to contact your Surgeon Team if you are concerned about pain control, blood clots, constipation, or if you are unable to urinate.     When to call - Reach out to Urgent Care   Order Comments: If you are not able to reach your Surgeon Team and you need immediate care, go to the Orthopedic Walk-in Clinic or Urgent Care at your Surgeon's office.  Do NOT go to the Emergency Room unless you have shortness of breath, chest pain, or other signs of a medical emergency.     When to call - Reasons to Call 911   Order Comments: Call 911 immediately if you experience sudden-onset chest pain, arm weakness/numbness, slurred speech, or shortness of breath     Discharge Instruction - Breathing exercises   Order Comments: Perform breathing exercises using your Incentive Spirometer 10 times per hour while awake for 2 weeks.     Symptoms - Fever Management   Order Comments: A low grade fever can be expected after surgery.  Use acetaminophen (TYLENOL) as needed for fever management.  Contact your Surgeon Team if you have a fever greater than 101.5 F, chills, and/or night sweats.     Symptoms - Constipation management   Order Comments: Constipation (hard, dry bowel movements) is expected after surgery due to the combination of " being less active, the anesthetic, and the opioid pain medication.  You can do the following to help reduce constipation:  ~  FLUIDS:  Drink clear liquids (water or Gatorade), or juice (apple/prune).  ~  DIET:  Eat a fiber rich diet.    ~  ACTIVITY:  Get up and move around several times a day.  Increase your activity as you are able.  MEDICATIONS:  Reduce the risk of constipation by starting medications before you are constipated.  You can take Miralax   (1 packet as directed) and/or a stool softener (Senokot 1-2 tablets 1-2 times a day).  If you already have constipation and these medications are not working, you can get magnesium citrate and use as directed.  If you continue to have constipation you can try an over the counter suppository or enema.  Call your Surgeon Team if it has been greater than 3 days since your last bowel movement.     Symptoms - Reduced Urine Output   Order Comments: Changes in the amount of fluids you drank before and after surgery may result in problems urinating.  It is important to stay well-hydrated after surgery and drink plenty of water. If it has been greater than 8 hours since you have urinated despite drinking plenty of water, call your Surgeon Team.     Comfort and Pain Management - Pain after Surgery   Order Comments: Pain after surgery is normal and expected.  You will have some amount of pain for several weeks after surgery.  Your pain will improve with time.  There are several things you can do to help reduce your pain including: rest, ice, elevation, and using pain medications as needed. Contact your Surgeon Team if you have pain that persists or worsens after surgery despite rest, ice, elevation, and taking your medication(s) as prescribed. Contact your Surgeon Team if you have new numbness, tingling, or weakness in your operative extremity.     Comfort and Pain Management - Swelling after Surgery   Order Comments: Swelling and/or bruising of the surgical extremity is  common and may persist for several months after surgery. In addition to frequent icing and elevation, gentle compressive support with an ACE wrap or tubigrip may help with swelling. Apply compression regularly, removing at least twice daily to perform skin checks. Contact your Surgeon Team if your swelling increases and is NOT associated with an increase in your activity level, or if your swelling increases and is associated with redness and pain.     Comfort and Pain Management - Cold therapy   Order Comments: Ice can be used to control swelling and discomfort after surgery. Place a thin towel over your operative site and apply the ice pack overtop. Leave ice pack in place for 20 minutes, then remove for 20 minutes. Repeat this 20 minutes on/20 minutes off routine as often as tolerated.     Medication Instructions - Acetaminophen (TYLENOL) Instructions   Order Comments: You were discharged with acetaminophen (TYLENOL) for pain management after surgery. Acetaminophen most effectively manages pain symptoms when it is taken on a schedule without missing doses (every four, six, or eight hours). Your Provider will prescribe a safe daily dose between 3000 - 4000 mg.  Do NOT exceed this daily dose. Most patients use acetaminophen for pain control for the first four weeks after surgery.  You can wean from this medication as your pain decreases.     Medication Instructions - Opioids - Tapering Instructions   Order Comments: In the first three days following surgery, your symptoms may warrant use of the narcotic pain medication every four to six hours as prescribed. This is normal. As your pain symptoms improve, focus your efforts on decreasing (tapering) use of narcotic medications. The most successful tapering strategy is to first, decrease the number of tablets you take every 4-6 hours to the minimum prescribed. Then, increase the amount of time between doses.  For example:  First, taper to   or 1 tablet every 4-6  "hours.  Then, taper to   or 1 tablet every 6-8 hours.  Then, taper to   or 1 tablet every 8-10 hours.  Then, taper to   or 1 tablet every 10-12 hours.  Then, taper to   or 1 tablet at bedtime.  The bedtime dose can help with comfort during sleep and is typically the last dose to be discontinued after surgery.     Comfort and Pain Management - LOWER Extremity Elevation   Order Comments: Swelling is expected for several months after surgery. This type of swelling is usually associated with gravity and activity, and can be improved with elevation.   The best way to do this is to get your \"toes above your nose\" by laying down and placing several pillows lengthwise under your calf and heel. When elevating your leg keep your knee completely straight. Perform this elevation as often as possible especially for the first two weeks after surgery.     Comfort and Pain Management - UPPER extremity Elevation   Order Comments: Swelling is expected for several months after surgery. This type of swelling is usually associated with gravity and activity, and can be improved with elevation.   The best way to do this is to get your hand above your heart by sitting down, resting your elbow on a pillow or arm rest, with your hand in the air. Perform this elevation as often as possible especially for the first two weeks after surgery     Activity - Total Hip Arthroplasty   Order Comments: Refer to the [facility] \"Your Guide to Total Joint Replacement\" for recommendations on activities and Exercises.     Order Specific Question Answer Comments   Is discharge order? Yes      Return to Driving   Order Comments: Return to driving - Driving is NOT permitted until directed by your provider. Under no circumstance are you permitted to drive while using narcotic pain medications.     Order Specific Question Answer Comments   Is discharge order? Yes      Dressing / Wound Care - Wound   Order Comments: You have a clean dressing on your " surgical wound. Dressing change instructions as follows: Keep your dressing in place until your follow up appointment in 2 weeks.  Contact your Surgeon Team if you have increased redness, warmth around the surgical wound, and/or drainage from the surgical wound.     Dressing / Wound Care - NO Tub Bathing   Order Comments: Tub bathing, swimming, or any other activities that will cause your incision to be submerged in water should be avoided until allowed by your Surgeon.     Weight bearing as tolerated   Order Comments: Weight bearing as tolerated on your operative extremity.     Order Specific Question Answer Comments   Is discharge order? Yes      Crutches DME   Order Comments: DME Documentation: Describe the reason for need to support medical necessity: Impaired gait status post hip surgery. I, the undersigned, certify that the above prescribed supplies are medically necessary for this patient and is both reasonable and necessary in reference to accepted standards of medical practice in the treatment of this patient's condition and is not prescribed as a convenience.     Order Specific Question Answer Comments   DME Provider: [facility]    Crutch Type: Standard    Crutches Add On: NA    Length of Need: Lifetime      Cane DME   Order Comments: DME Documentation: Describe the reason for need to support medical necessity: Impaired gait status post hip surgery. I, the undersigned, certify that the above prescribed supplies are medically necessary for this patient and is both reasonable and necessary in reference to accepted standards of medical practice in the treatment of this patient's condition and is not prescribed as a convenience.     Order Specific Question Answer Comments   DME Provider: [facility]    Cane Type: Single Tip    Reminder: Patient can typically get 1 every 5 years      Walker DME   Order Comments: : DME Documentation: Describe the reason for need to support medical necessity: Impaired gait  "status post hip surgery. I, the undersigned, certify that the above prescribed supplies are medically necessary for this patient and is both reasonable and necessary in reference to accepted standards of medical practice in the treatment of this patient's condition and is not prescribed as a convenience.     Order Specific Question Answer Comments   DME Provider: [facility]    Walker Type: Standard (2 Wheel)    Accessories: N/A      Diet   Order Comments: Follow this diet upon discharge: Orders Placed This Encounter      Advance Diet as Tolerated: Regular Diet Adult       Order Specific Question Answer Comments   Is discharge order? Yes      Discharge Instruction - Regular Diet Adult   Order Comments: Return to your pre-surgery diet unless instructed otherwise     Order Specific Question Answer Comments   Is discharge order? Yes      Examination     Vital Signs in last 24 hours:   Vital signs:  Temp: 97.4  F (36.3  C) Temp src: Oral BP: 115/70 Pulse: 83   Resp: 18 SpO2: 97 % O2 Device: None (Room air) Oxygen Delivery: 6 LPM Height: 160 cm (5' 3\") Weight: 69.4 kg (152 lb 14.4 oz)  Estimated body mass index is 27.09 kg/m  as calculated from the following:    Height as of this encounter: 1.6 m (5' 3\").    Weight as of this encounter: 69.4 kg (152 lb 14.4 oz).       Pertinent positives on exam:  Right hip incision clean     Please see EMR for more detailed significant labs, imaging, consultant notes etc.  Total time spent on discharge: > 35 minutes    Essie Syed MD   [facility] Hospitalist Service: Ph:[phone]    CC:System, Provider Not In      "

## 2021-10-10 NOTE — PLAN OF CARE
Patient vital signs are at baseline: Yes  Patient able to ambulate as they were prior to admission or with assist devices provided by therapies during their stay:  Yes  Patient MUST void prior to discharge:  Yes  Patient able to tolerate oral intake:  Yes  Pain has adequate pain control using Oral analgesics:  Yes   CMS intact. Dressing CDI. Up with SBA and walker for transfers and ambulation. Anticipated discharge to home today. Will continue to monitor and follow plan of care.  Problem: Joint Function Impaired (Hip Arthroplasty)  Goal: Optimal Functional Ability  Outcome: Improving   Problem: Pain (Hip Arthroplasty)  Goal: Acceptable Pain Control  Outcome: Improving  Intervention: Prevent or Manage Pain  Recent Flowsheet Documentation  Taken 10/9/2021 1958 by Antonio Sheffield, RN  Pain Management Interventions:   aromatherapy   care clustered   cold applied   breathing exercises   pillow support provided   rest   therapeutic presence

## 2021-10-10 NOTE — PROGRESS NOTES
Care Management Discharge Note    Discharge Date: 10/10/2021       Discharge Disposition:      Discharge Services:      Discharge DME:      Discharge Transportation:      Private pay costs discussed: Not applicable    PAS Confirmation Code:    Patient/family educated on Medicare website which has current facility and service quality ratings:      Education Provided on the Discharge Plan:    Persons Notified of Discharge Plans: Patient and Home Care  Patient/Family in Agreement with the Plan:      Handoff Referral Completed: No    Additional Information:  Updated [facility] of discharge plan and home care orders faxed.        MAGALY Blue

## 2021-10-12 LAB
PATH REPORT.COMMENTS IMP SPEC: NORMAL
PATH REPORT.FINAL DX SPEC: NORMAL
PATH REPORT.GROSS SPEC: NORMAL
PATH REPORT.MICROSCOPIC SPEC OTHER STN: NORMAL
PATH REPORT.RELEVANT HX SPEC: NORMAL
PHOTO IMAGE: NORMAL

## 2021-10-12 PROCEDURE — 88313 SPECIAL STAINS GROUP 2: CPT | Mod: 26 | Performed by: PATHOLOGY

## 2021-10-12 PROCEDURE — 88365 INSITU HYBRIDIZATION (FISH): CPT | Mod: 26 | Performed by: PATHOLOGY

## 2021-10-12 PROCEDURE — 88305 TISSUE EXAM BY PATHOLOGIST: CPT | Mod: 26 | Performed by: PATHOLOGY

## 2021-10-12 PROCEDURE — 88341 IMHCHEM/IMCYTCHM EA ADD ANTB: CPT | Mod: 26 | Performed by: PATHOLOGY

## 2021-10-12 PROCEDURE — 88364 INSITU HYBRIDIZATION (FISH): CPT | Mod: 26 | Performed by: PATHOLOGY

## 2021-10-12 PROCEDURE — 88311 DECALCIFY TISSUE: CPT | Mod: 26 | Performed by: PATHOLOGY

## 2021-10-12 PROCEDURE — 88342 IMHCHEM/IMCYTCHM 1ST ANTB: CPT | Mod: 26 | Performed by: PATHOLOGY

## 2021-10-16 ENCOUNTER — HEALTH MAINTENANCE LETTER (OUTPATIENT)
Age: 56
End: 2021-10-16

## 2022-07-17 ENCOUNTER — HEALTH MAINTENANCE LETTER (OUTPATIENT)
Age: 57
End: 2022-07-17

## 2022-09-25 ENCOUNTER — HEALTH MAINTENANCE LETTER (OUTPATIENT)
Age: 57
End: 2022-09-25

## 2023-08-05 ENCOUNTER — HEALTH MAINTENANCE LETTER (OUTPATIENT)
Age: 58
End: 2023-08-05

## 2023-10-14 ENCOUNTER — HEALTH MAINTENANCE LETTER (OUTPATIENT)
Age: 58
End: 2023-10-14

## 2024-09-28 ENCOUNTER — HEALTH MAINTENANCE LETTER (OUTPATIENT)
Age: 59
End: 2024-09-28

## (undated) DEVICE — PAD HIP POSITIONING MCGUIRE 707-CPM

## (undated) DEVICE — DRSG ABD TNDRSRB WET PRUF 8IN X 10IN STRL  9194A

## (undated) DEVICE — MAT FLOOR SURGICAL 40X38 0702140238

## (undated) DEVICE — DRAPE IOBAN INCISE 36X23" 6651EZ

## (undated) DEVICE — HOOD FLYTE SURGICOOL

## (undated) DEVICE — BLADE SAW SAGITTAL STRK WIDE 25.4X85X1.2MM 2108-151-000

## (undated) DEVICE — CUSTOM PACK TOTAL HIP SOP5BTHHEA

## (undated) DEVICE — PLATE GROUNDING ADULT W/CORD 9165L

## (undated) DEVICE — GLOVE BIOGEL INDICATOR 7.5 LF 41675

## (undated) DEVICE — ESU ELEC BLADE 6" COATED E1450-6

## (undated) DEVICE — DRESSING MEPILEX AG SILVER 4X12 395990

## (undated) DEVICE — SU STRATAFIX PDS PLUS 1 CT-1 18" SXPP1A404

## (undated) DEVICE — GLOVE BIOGEL PI ULTRATOUCH G SZ 7.5 42175

## (undated) DEVICE — SU ETHIBOND 5 V-37 4X30" MB66G

## (undated) DEVICE — GLOVE BIOGEL PI INDICATOR 8.0 LF 41680

## (undated) DEVICE — HOLDER LIMB VELCRO OR 0814-1533

## (undated) DEVICE — SUCTION MANIFOLD NEPTUNE 2 SYS 4 PORT 0702-020-000

## (undated) DEVICE — SOL NACL 0.9% IRRIG 1000ML BOTTLE 2F7124

## (undated) DEVICE — PREP CHLORAPREP 26ML TINTED HI-LITE ORANGE 930815

## (undated) DEVICE — GLOVE BIOGEL PI ORTHOPRO SZ 7.5 47675

## (undated) DEVICE — SOL WATER IRRIG 1000ML BOTTLE 2F7114

## (undated) DEVICE — DRSG GAUZE 4X4" 3033

## (undated) DEVICE — SUTURE MONOCRYL+ 2-0 CT-1 36" UNDYED MCP945H